# Patient Record
Sex: FEMALE | Race: WHITE | NOT HISPANIC OR LATINO | Employment: FULL TIME | URBAN - METROPOLITAN AREA
[De-identification: names, ages, dates, MRNs, and addresses within clinical notes are randomized per-mention and may not be internally consistent; named-entity substitution may affect disease eponyms.]

---

## 2018-08-14 ENCOUNTER — APPOINTMENT (OUTPATIENT)
Dept: RADIOLOGY | Age: 33
End: 2018-08-14
Payer: COMMERCIAL

## 2018-08-14 ENCOUNTER — OFFICE VISIT (OUTPATIENT)
Dept: URGENT CARE | Age: 33
End: 2018-08-14
Payer: COMMERCIAL

## 2018-08-14 VITALS
BODY MASS INDEX: 25.32 KG/M2 | RESPIRATION RATE: 20 BRPM | OXYGEN SATURATION: 99 % | WEIGHT: 137.6 LBS | HEIGHT: 62 IN | TEMPERATURE: 96.5 F

## 2018-08-14 DIAGNOSIS — M54.2 NECK PAIN: ICD-10-CM

## 2018-08-14 DIAGNOSIS — M54.2 NECK PAIN: Primary | ICD-10-CM

## 2018-08-14 PROCEDURE — 99203 OFFICE O/P NEW LOW 30 MIN: CPT | Performed by: PHYSICIAN ASSISTANT

## 2018-08-14 PROCEDURE — 72050 X-RAY EXAM NECK SPINE 4/5VWS: CPT

## 2018-08-14 RX ORDER — DESOGESTREL AND ETHINYL ESTRADIOL 21-5 (28)
1 KIT ORAL
COMMUNITY
Start: 2018-04-23

## 2018-08-14 RX ORDER — PAROXETINE HYDROCHLORIDE 40 MG/1
40 TABLET, FILM COATED ORAL
COMMUNITY
Start: 2018-06-26

## 2018-08-14 RX ORDER — METHOCARBAMOL 750 MG/1
750 TABLET, FILM COATED ORAL EVERY 6 HOURS PRN
Qty: 15 TABLET | Refills: 0 | Status: SHIPPED | OUTPATIENT
Start: 2018-08-14 | End: 2019-04-18 | Stop reason: ALTCHOICE

## 2018-08-14 RX ORDER — BUPROPION HYDROCHLORIDE 150 MG/1
150 TABLET ORAL
COMMUNITY
Start: 2018-02-12 | End: 2019-04-18 | Stop reason: ALTCHOICE

## 2018-08-14 RX ORDER — FLUTICASONE PROPIONATE 50 MCG
1 SPRAY, SUSPENSION (ML) NASAL
COMMUNITY
Start: 2015-04-20 | End: 2020-02-27 | Stop reason: SDUPTHER

## 2018-08-14 RX ORDER — ALPRAZOLAM 0.5 MG/1
0.5 TABLET ORAL 2 TIMES DAILY
COMMUNITY
Start: 2018-06-26

## 2018-08-14 NOTE — PATIENT INSTRUCTIONS
Motrin and/or Tylenol as needed for pain  Take medications as directed  Follow up with PCP in 3-5 days  Proceed to  ER if symptoms worsen  Acute Neck Pain   AMBULATORY CARE:   Acute neck pain  starts suddenly, increases quickly, and goes away in a few days  The pain may come and go, or be worse with certain movements  The pain may be only in your neck, or it may move to your arms, back, or shoulders  You may also have pain that starts in another body area and moves to your neck  Seek care immediately if:   · You have an injury that causes neck pain and shooting pain down your arms or legs  · Your neck pain suddenly becomes severe  · You have neck pain along with numbness, tingling, or weakness in your arms or legs  · You have a stiff neck, a headache, and a fever  Contact your healthcare provider if:   · You have new or worsening symptoms  · Your symptoms continue even after treatment  · You have questions or concerns about your condition or care  Treatment  may include any of the following, depending on what is causing your pain:  · Medicines  may be prescribed or recommended by your healthcare provider for pain  You may need medicine to treat nerve pain or to stop muscle spasms  Medicines may also be given to reduce inflammation  Your healthcare provider may inject medicine into a nerve to block pain  Over-the-counter NSAID medicine or acetaminophen may be recommended to help treat minor pain or inflammation  · Traction  is used to relieve pressure from nerves  Your head is gently pulled up and away from your neck  This stretches muscles and ligaments and makes more room for the spine  Your healthcare provider will tell you the kind of traction that will help your neck pain  Do not use traction devices at home unless directed by your healthcare provider  Manage or prevent acute neck pain:   · Rest your neck as directed    Do not make sudden movements, such as turning your head quickly  Your healthcare provider may recommend you wear a cervical collar for a short time  The collar will prevent you from moving your head  This will give your neck time to heal if an injury is causing your neck pain  Ask your healthcare provider when you can return to sports or other normal daily activities  · Apply heat as directed  Heat helps relieve pain and swelling  Use a heat wrap, or soak a small towel in warm water  Wring out the extra water  Apply the heat wrap or towel for 20 minutes every hour, or as directed  · Apply ice as directed  Ice helps relieve pain and swelling, and can help prevent tissue damage  Use an ice pack, or put ice in a bag  Cover the ice pack or back with a towel before you apply it to your neck  Apply the ice pack or ice for 15 minutes every hour, or as directed  Your healthcare provider can tell you how often to apply ice  · Do neck exercises as directed  Neck exercises help strengthen the muscles and increase range of motion  Your healthcare provider will tell you which exercises are right for you  He may give you instructions, or he may recommend that you work with a physical therapist  Your healthcare provider or therapist can make sure you are doing the exercises correctly  · Maintain good posture  Try to keep your head and shoulders lifted when you sit  If you work in front of a computer, make sure the monitor is at the right level  You should not need to look up down to see the screen  You should also not have to lean forward to be able to read what is on the screen  Make sure your keyboard, mouse, and other computer items are placed where you do not have to extend your shoulder to reach them  Get up often if you work in front of a computer or sit for long periods of time  Stretch or walk around to keep your neck muscles loose  Follow up with your healthcare provider as directed:   Your healthcare provider may refer you to a specialist if your pain does not get better with treatment  Write down your questions so you remember to ask them during your visits  © 2017 [employer] Information is for End User's use only and may not be sold, redistributed or otherwise used for commercial purposes  All illustrations and images included in CareNotes® are the copyrighted property of [employer]  or Jonathan Li  The above information is an  only  It is not intended as medical advice for individual conditions or treatments  Talk to your doctor, nurse or pharmacist before following any medical regimen to see if it is safe and effective for you
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
## 2019-04-18 ENCOUNTER — OFFICE VISIT (OUTPATIENT)
Dept: URGENT CARE | Age: 34
End: 2019-04-18
Payer: COMMERCIAL

## 2019-04-18 VITALS
HEART RATE: 67 BPM | RESPIRATION RATE: 20 BRPM | OXYGEN SATURATION: 99 % | TEMPERATURE: 98.6 F | WEIGHT: 141 LBS | BODY MASS INDEX: 25.95 KG/M2 | DIASTOLIC BLOOD PRESSURE: 72 MMHG | HEIGHT: 62 IN | SYSTOLIC BLOOD PRESSURE: 162 MMHG

## 2019-04-18 DIAGNOSIS — S39.012A STRAIN OF MUSCLE, FASCIA AND TENDON OF LOWER BACK, INITIAL ENCOUNTER: Primary | ICD-10-CM

## 2019-04-18 PROCEDURE — 99213 OFFICE O/P EST LOW 20 MIN: CPT | Performed by: PHYSICIAN ASSISTANT

## 2019-09-24 ENCOUNTER — OFFICE VISIT (OUTPATIENT)
Dept: URGENT CARE | Facility: CLINIC | Age: 34
End: 2019-09-24

## 2019-09-24 DIAGNOSIS — Z23 NEED FOR INFLUENZA VACCINATION: Primary | ICD-10-CM

## 2019-09-24 NOTE — PATIENT INSTRUCTIONS
Please call with any questions or concerns  Influenza Vaccine   AMBULATORY CARE:   The influenza vaccine  is an injection given to help prevent influenza (flu)  The flu is caused by a virus  The virus spreads from person to person through coughing and sneezing  Several types of viruses cause the flu  The viruses change over time, so new vaccines are made each year  The vaccine begins to protect you about 2 weeks after you get it  The flu shot usually injected into your upper arm  It may be given in your thigh  You may get a vaccine with a weak or dead virus  Call 911 for any of the following:   · Your mouth and throat are swollen  · You are wheezing or have trouble breathing  · You have chest pain or your heart is beating faster than normal for you  · You feel like you are going to faint  Seek care immediately if:   · Your face is red or swollen  · You have hives that spread over your body  · You feel weak or dizzy  Contact your healthcare provider if:   · You have increased pain, redness, or swelling around the area where the shot was given  · You have questions or concerns about the influenza vaccine  When to get the influenza vaccine: The influenza vaccine is offered every year starting in September or October  Get the influenza vaccine as soon as it is available  Children 6 months to 6years old need 2 doses during the first year they get the vaccine  The 2 doses should be given at least 4 weeks apart  It is best if the same type of vaccine is given both times  The child can then receive 1 dose each year  Children 9 years or older should get 1 dose each year          Who should get the flu shot:   · Infants 6 months or older    · Any healthy adult who would like to decrease the risk for the flu    · Anyone living with or caring for children younger than 5 years     · Healthcare workers    · Anyone who lives in a long-term care facility    · Anyone who has chronic health problems, such as asthma, diabetes, or blood disorders    · Anyone who has a weak immune system    · Women who are or will be pregnant during the flu season  Who should not get the flu shot:  If you have an egg allergy, ask your healthcare provider if it is safe to get the flu shot  You will need to be closely monitored by a healthcare provider while you receive the vaccine, and for an hour or more after  The following should not get the flu shot:  · Infants younger than 6 months     · Anyone who has had an allergic reaction to the flu shot    · Anyone who is sick or has a fever    · Anyone who received a diagnosis of Guillain-Barré syndrome within 6 weeks of getting a flu vaccine    · Anyone who is allergic to thimerosal (mercury)  Risks of the influenza vaccine: The flu shot may cause mild symptoms, such as a fever, headache, and muscle aches  It may also cause mild to moderate soreness or redness at the area where you were given the shot  The nasal spray may cause a fever, runny or stuffy nose, headache, muscle aches, or vomiting  You may still get the flu after you receive the influenza vaccine  If you are allergic to eggs, ask about an egg-free vaccine  You may have an allergic reaction to the vaccine  This can be life-threatening  Follow up with your healthcare provider as directed:  Write down your questions so you remember to ask them during your visits  © 2017 [employer] Information is for End User's use only and may not be sold, redistributed or otherwise used for commercial purposes  All illustrations and images included in CareNotes® are the copyrighted property of [employer]  or Jonathan Li  The above information is an  only  It is not intended as medical advice for individual conditions or treatments  Talk to your doctor, nurse or pharmacist before following any medical regimen to see if it is safe and effective for you

## 2019-09-24 NOTE — PROGRESS NOTES
[facility]        NAME: Kassandra Bhatt is a 28 y o  female  : 1985    MRN: [id number]  DATE: 2019  TIME: 12:22 PM    Assessment and Plan   Need for influenza vaccination [Z23]  1  Need for influenza vaccination  FLUARIX: influenza vaccine, quadrivalent, 0 5 mL         Patient Instructions       Please call with any questions or concerns  Proceed to  ER if symptoms worsen  Chief Complaint     Chief Complaint   Patient presents with    Immunizations     Flu shot          History of Present Illness       Pt is due for seasonal flu shot       Review of Systems   Review of Systems   Constitutional: Negative  Respiratory: Negative  Cardiovascular: Negative  All other systems reviewed and are negative  Current Medications       Current Outpatient Medications:     ALPRAZolam (XANAX) 0 5 mg tablet, Take 0 5 mg by mouth 2 (two) times a day, Disp: , Rfl:     desogestrel-ethinyl estradiol (KARIVA) 0 15-0 02/0 01 MG () per tablet, Take 1 tablet by mouth, Disp: , Rfl:     fluticasone (FLONASE) 50 mcg/act nasal spray, 1 spray, Disp: , Rfl:     PARoxetine (PAXIL) 40 MG tablet, Take 40 mg by mouth, Disp: , Rfl:     Current Allergies     Allergies as of 2019 - Reviewed 2019   Allergen Reaction Noted    Prednisone Other (See Comments) 2009            The following portions of the patient's history were reviewed and updated as appropriate: allergies, current medications, past family history, past medical history, past social history, past surgical history and problem list      Past Medical History:   Diagnosis Date    Anxiety     Depression        Past Surgical History:   Procedure Laterality Date    BREAST EXCISIONAL BIOPSY Right 2003    TONSILLECTOMY      VAGINAL DELIVERY         History reviewed  No pertinent family history  Medications have been verified  Objective   There were no vitals taken for this visit         Physical Exam Physical Exam   Constitutional: She is oriented to person, place, and time  She appears well-developed and well-nourished  HENT:   Head: Normocephalic and atraumatic  Neurological: She is alert and oriented to person, place, and time  Skin: Skin is warm and dry  Psychiatric: She has a normal mood and affect   Her behavior is normal

## 2020-01-27 ENCOUNTER — OFFICE VISIT (OUTPATIENT)
Dept: URGENT CARE | Facility: CLINIC | Age: 35
End: 2020-01-27

## 2020-01-27 VITALS
HEART RATE: 80 BPM | DIASTOLIC BLOOD PRESSURE: 80 MMHG | OXYGEN SATURATION: 98 % | RESPIRATION RATE: 16 BRPM | TEMPERATURE: 99.4 F | SYSTOLIC BLOOD PRESSURE: 118 MMHG

## 2020-01-27 DIAGNOSIS — J11.1 INFLUENZA: Primary | ICD-10-CM

## 2020-01-27 LAB
SL AMB POCT RAPID FLU A: POSITIVE
SL AMB POCT RAPID FLU B: NEGATIVE

## 2020-01-27 RX ORDER — OSELTAMIVIR PHOSPHATE 75 MG/1
75 CAPSULE ORAL EVERY 12 HOURS SCHEDULED
Qty: 10 CAPSULE | Refills: 0 | Status: SHIPPED | OUTPATIENT
Start: 2020-01-27 | End: 2020-02-01

## 2020-01-27 NOTE — ASSESSMENT & PLAN NOTE
Rapid flu test positive for flu A  Rx Tamiflu  Symptomatic care  Stay home from work until fever free for 24 hours  Follow up if symptoms worsen or persist

## 2020-01-27 NOTE — PROGRESS NOTES
[facility]        NAME: Boogie Peraza is a 27 y o  female  : 1985    MRN: [id number]  DATE: 2020  TIME: 10:53 AM    Assessment and Plan   Influenza [J11 1]  1  Influenza  oseltamivir (TAMIFLU) 75 mg capsule    POCT rapid flu A and B         Patient Instructions       Take Tamiflu as prescribed  Increase water intake  Take Tylenol as needed for fever or discomfort  Stay home from work until you are fever free for 24 hours without taking Tylenol or Motrin  Follow up with your PCP or return to the clinic if symptoms worsen or persist more than 3-5 days  Proceed to  ER if symptoms worsen  Chief Complaint     Chief Complaint   Patient presents with    Influenza         History of Present Illness       Body aches, fatigue, subjective fever, rhinitis, cough x 1 day  She awoke with symptoms today  Her son was diagnosed with the flu this weekend  She did not take any medication for this yet  She had her flu shot this season  Review of Systems   Review of Systems   Constitutional: Positive for fatigue and fever (subjective)  HENT: Positive for congestion, postnasal drip, rhinorrhea and sore throat  Respiratory: Positive for cough (dry)  Negative for shortness of breath and wheezing  Cardiovascular: Negative  All other systems reviewed and are negative          Current Medications       Current Outpatient Medications:     ALPRAZolam (XANAX) 0 5 mg tablet, Take 0 5 mg by mouth 2 (two) times a day, Disp: , Rfl:     desogestrel-ethinyl estradiol (KARIVA) 0 15-0 02/0 01 MG () per tablet, Take 1 tablet by mouth, Disp: , Rfl:     fluticasone (FLONASE) 50 mcg/act nasal spray, 1 spray, Disp: , Rfl:     PARoxetine (PAXIL) 40 MG tablet, Take 40 mg by mouth, Disp: , Rfl:     oseltamivir (TAMIFLU) 75 mg capsule, Take 1 capsule (75 mg total) by mouth every 12 (twelve) hours for 5 days, Disp: 10 capsule, Rfl: 0    Current Allergies     Allergies as of 2020 - Reviewed 01/27/2020   Allergen Reaction Noted    Prednisone Other (See Comments) 05/31/2009            The following portions of the patient's history were reviewed and updated as appropriate: allergies, current medications, past family history, past medical history, past social history, past surgical history and problem list      Past Medical History:   Diagnosis Date    Anxiety     Depression        Past Surgical History:   Procedure Laterality Date    BREAST EXCISIONAL BIOPSY Right 2003    TONSILLECTOMY      VAGINAL DELIVERY         History reviewed  No pertinent family history  Medications have been verified  Objective   /80 (BP Location: Right arm, Patient Position: Sitting, Cuff Size: Adult)   Pulse 80   Temp 99 4 °F (37 4 °C) (Tympanic)   Resp 16   SpO2 98%   Breastfeeding No        Physical Exam     Physical Exam   Constitutional: She is oriented to person, place, and time  She appears well-developed and well-nourished  HENT:   Head: Normocephalic and atraumatic  Right Ear: Hearing, tympanic membrane, external ear and ear canal normal    Left Ear: Hearing, tympanic membrane, external ear and ear canal normal    Nose: Mucosal edema and rhinorrhea present  Mouth/Throat: Uvula is midline and mucous membranes are normal  Posterior oropharyngeal erythema (cobblestoning appearance to throat) present  Tonsils are 0 on the right  Tonsils are 0 on the left  No tonsillar exudate  Eyes: Pupils are equal, round, and reactive to light  Conjunctivae are normal    Cardiovascular: Normal rate and regular rhythm  Pulmonary/Chest: Effort normal and breath sounds normal    Neurological: She is alert and oriented to person, place, and time  Skin: Skin is warm and dry  Capillary refill takes less than 2 seconds  Vitals reviewed

## 2020-01-27 NOTE — PATIENT INSTRUCTIONS
Take Tamiflu as prescribed  Increase water intake  Take Tylenol as needed for fever or discomfort  Stay home from work until you are fever free for 24 hours without taking Tylenol or Motrin  Follow up with your PCP or return to the clinic if symptoms worsen or persist more than 3-5 days  Influenza   AMBULATORY CARE:   Influenza  (the flu) is an infection caused by the influenza virus  The flu is easily spread when an infected person coughs, sneezes, or has close contact with others  You may be able to spread the flu to others for 1 week or longer after signs or symptoms appear  Common signs and symptoms include the following:   · Fever and chills    · Headaches, body aches, and muscle or joint pain    · Cough, runny nose, and sore throat    · Loss of appetite, nausea, vomiting, or diarrhea    · Tiredness    · Trouble breathing  Call 911 for any of the following:   · You have trouble breathing, and your lips look purple or blue  · You have a seizure  Seek care immediately if:   · You are dizzy, or you are urinating less or not at all  · You have a headache with a stiff neck, and you feel tired or confused  · You have new pain or pressure in your chest     · Your symptoms, such as shortness of breath, vomiting, or diarrhea, get worse  · Your symptoms, such as fever and coughing, seem to get better, but then get worse  Contact your healthcare provider if:   · You have new muscle pain or weakness  · You have questions or concerns about your condition or care  Treatment for influenza  may include any of the following:  · Acetaminophen  decreases pain and fever  It is available without a doctor's order  Ask how much to take and how often to take it  Follow directions  Acetaminophen can cause liver damage if not taken correctly  · NSAIDs , such as ibuprofen, help decrease swelling, pain, and fever  This medicine is available with or without a doctor's order   NSAIDs can cause stomach bleeding or kidney problems in certain people  If you take blood thinner medicine, always ask your healthcare provider if NSAIDs are safe for you  Always read the medicine label and follow directions  · Antivirals  help fight a viral infection  Manage your symptoms:   · Rest  as much as you can to help you recover  · Drink liquids as directed  to help prevent dehydration  Ask how much liquid to drink each day and which liquids are best for you  Prevent the spread of the flu:   · Wash your hands often  Use soap and water  Wash your hands after you use the bathroom, change a child's diapers, or sneeze  Wash your hands before you prepare or eat food  Use gel hand cleanser when soap and water are not available  Do not touch your eyes, nose, or mouth unless you have washed your hands first            · Cover your mouth when you sneeze or cough  Cough into a tissue or the bend of your arm  · Clean shared items with a germ-killing   Clean table surfaces, doorknobs, and light switches  Do not share towels, silverware, and dishes with people who are sick  Wash bed sheets, towels, silverware, and dishes with soap and water  · Wear a mask  over your mouth and nose if you are sick or are near anyone who is sick  · Stay away from others  if you are sick  · Influenza vaccine  helps prevent influenza (flu)  Everyone older than 6 months should get a yearly influenza vaccine  Get the vaccine as soon as it is available, usually in September or October each year  Follow up with your healthcare provider as directed:  Write down your questions so you remember to ask them during your visits  © 2017 [address] [employer] Information is for End User's use only and may not be sold, redistributed or otherwise used for commercial purposes  All illustrations and images included in CareNotes® are the copyrighted property of [employer]  or [employer] Li    The above information is an  only  It is not intended as medical advice for individual conditions or treatments  Talk to your doctor, nurse or pharmacist before following any medical regimen to see if it is safe and effective for you

## 2020-02-27 ENCOUNTER — OFFICE VISIT (OUTPATIENT)
Dept: URGENT CARE | Facility: CLINIC | Age: 35
End: 2020-02-27

## 2020-02-27 VITALS
RESPIRATION RATE: 16 BRPM | TEMPERATURE: 97.6 F | SYSTOLIC BLOOD PRESSURE: 122 MMHG | DIASTOLIC BLOOD PRESSURE: 80 MMHG | HEART RATE: 70 BPM | OXYGEN SATURATION: 99 %

## 2020-02-27 DIAGNOSIS — J01.00 ACUTE NON-RECURRENT MAXILLARY SINUSITIS: Primary | ICD-10-CM

## 2020-02-27 RX ORDER — FLUTICASONE PROPIONATE 50 MCG
2 SPRAY, SUSPENSION (ML) NASAL DAILY
Qty: 1 BOTTLE | Refills: 0 | Status: SHIPPED | OUTPATIENT
Start: 2020-02-27 | End: 2020-11-17 | Stop reason: SDUPTHER

## 2020-02-27 RX ORDER — AMOXICILLIN AND CLAVULANATE POTASSIUM 875; 125 MG/1; MG/1
1 TABLET, FILM COATED ORAL EVERY 12 HOURS SCHEDULED
Qty: 14 TABLET | Refills: 0 | Status: SHIPPED | OUTPATIENT
Start: 2020-02-27 | End: 2020-03-05

## 2020-02-27 RX ORDER — QUETIAPINE FUMARATE 25 MG/1
25 TABLET, FILM COATED ORAL
COMMUNITY
Start: 2019-11-04

## 2020-02-27 NOTE — ASSESSMENT & PLAN NOTE
Augmentin  Flonase nasal spray (pt tolerates flonase well with no allergies)  Increase water intake  Honey and warm salt water gargles PRN sore throat & cough  Nasal saline rinses  Follow up if symptoms worsen or persist

## 2020-02-27 NOTE — PROGRESS NOTES
[email or web address]        NAME: Tabatha Arauz is a 27 y o  female  : 1985    MRN: [id number]  DATE: 2020  TIME: 11:37 AM    Assessment and Plan   Acute non-recurrent maxillary sinusitis [J01 00]  1  Acute non-recurrent maxillary sinusitis  amoxicillin-clavulanate (AUGMENTIN) 875-125 mg per tablet    fluticasone (FLONASE) 50 mcg/act nasal spray         Patient Instructions       Take Augmentin as prescribed, take with food  Continue Flonase nasal spray daily  Increase your water intake and use nasal saline rinses  Recommend warm salt water gargles and honey as needed for sore throat and cough  Follow up with your PCP or return to the clinic if symptoms worsen or persist more than 3-5 days  Proceed to  ER if symptoms worsen  Chief Complaint     Chief Complaint   Patient presents with    Sinusitis         History of Present Illness       Sinus congestion, PND, sore throat x 1 week, Symptoms are worsening  Purulent yellow colored nasal discharge  She tried flonsase, symptoms are persisting  No fevers or chills  Review of Systems   Review of Systems   Constitutional: Negative  HENT: Positive for congestion (yellow colored nasal discharge), postnasal drip, rhinorrhea, sinus pressure, sinus pain and sore throat  Eyes: Negative  Respiratory: Positive for cough (dry)  Cardiovascular: Negative  Skin: Negative  Neurological: Positive for headaches  All other systems reviewed and are negative          Current Medications       Current Outpatient Medications:     ALPRAZolam (XANAX) 0 5 mg tablet, Take 0 5 mg by mouth 2 (two) times a day, Disp: , Rfl:     desogestrel-ethinyl estradiol (KARIVA) 0 15-0 02/0 01 MG () per tablet, Take 1 tablet by mouth, Disp: , Rfl:     fluticasone (FLONASE) 50 mcg/act nasal spray, 2 sprays into each nostril daily, Disp: 1 Bottle, Rfl: 0    PARoxetine (PAXIL) 40 MG tablet, Take 40 mg by mouth, Disp: , Rfl:     QUEtiapine (SEROquel) 25 mg tablet, Take 25 mg by mouth, Disp: , Rfl:     amoxicillin-clavulanate (AUGMENTIN) 875-125 mg per tablet, Take 1 tablet by mouth every 12 (twelve) hours for 7 days, Disp: 14 tablet, Rfl: 0    Current Allergies     Allergies as of 02/27/2020 - Reviewed 02/27/2020   Allergen Reaction Noted    Prednisone Other (See Comments) 05/31/2009            The following portions of the patient's history were reviewed and updated as appropriate: allergies, current medications, past family history, past medical history, past social history, past surgical history and problem list      Past Medical History:   Diagnosis Date    Anxiety     Depression        Past Surgical History:   Procedure Laterality Date    BREAST EXCISIONAL BIOPSY Right 2003    TONSILLECTOMY      VAGINAL DELIVERY         History reviewed  No pertinent family history  Medications have been verified  Objective   /80 (BP Location: Right arm, Patient Position: Sitting, Cuff Size: Adult)   Pulse 70   Temp 97 6 °F (36 4 °C) (Tympanic)   Resp 16   SpO2 99%        Physical Exam     Physical Exam   Constitutional: She is oriented to person, place, and time  She appears well-developed and well-nourished  HENT:   Head: Normocephalic and atraumatic  Right Ear: Hearing, external ear and ear canal normal  A middle ear effusion is present  Left Ear: Hearing, external ear and ear canal normal  A middle ear effusion is present  Nose: Mucosal edema and rhinorrhea present  Right sinus exhibits maxillary sinus tenderness  Left sinus exhibits maxillary sinus tenderness  Mouth/Throat: Uvula is midline and mucous membranes are normal  Posterior oropharyngeal erythema (cobblestoning appearance to throat) present  No oropharyngeal exudate  Tonsils are 0 on the right  Tonsils are 0 on the left  No tonsillar exudate  Eyes: Pupils are equal, round, and reactive to light  Conjunctivae are normal    Neck: Normal range of motion   Neck supple  Cardiovascular: Normal rate, regular rhythm and normal heart sounds  Pulmonary/Chest: Effort normal and breath sounds normal    Neurological: She is alert and oriented to person, place, and time  Skin: Skin is warm and dry  Psychiatric: She has a normal mood and affect  Her behavior is normal    Vitals reviewed

## 2020-02-27 NOTE — PATIENT INSTRUCTIONS
Take Augmentin as prescribed, take with food  Continue Flonase nasal spray daily  Increase your water intake and use nasal saline rinses  Recommend warm salt water gargles and honey as needed for sore throat and cough  Follow up with your PCP or return to the clinic if symptoms worsen or persist more than 3-5 days  Sinusitis, Ambulatory Care   GENERAL INFORMATION:   Sinusitis  is inflammation or infection of your sinuses  It is most often caused by a virus  Acute sinusitis may last up to 12 weeks  Chronic sinusitis lasts longer than 12 weeks  Recurrent sinusitis is when you have 3 or more episodes of sinusitis in 1 year  Common symptoms include the following:   · Fever    · Pain, pressure, redness, or swelling around the forehead, cheeks, or eyes    · Thick yellow or green discharge from your nose    · Tenderness when you touch your face over your sinuses    · Dry cough that happens mostly at night or when you lie down    · Headache and face pain that is worse when you lean forward    · Teeth pain or pain when you chew  Seek immediate care for the following symptoms:   · Vision changes such as double vision    · Confusion or trouble thinking clearly    · Headache and stiff neck    · Trouble breathing  Treatment for sinusitis  may include medicines to relieve nasal and sinus congestion or to decrease pain and fever  Ask your healthcare provider which medicines you should take and how much is safe  Manage sinusitis:   · Drink liquids as directed  Ask your healthcare provider how much liquid to drink each day and which liquids are best for you  Liquids will help loosen and drain the mucus in your sinuses  · Breathe in steam   Heat a bowl of water until you see steam  Lean over the bowl and make a tent over your head with a large towel  Breathe deeply for about 20 minutes  Be careful not to get too close to the steam or burn yourself  Do this 3 times a day   You can also breathe deeply when you take a hot shower  · Rinse your sinuses  Use a sinus rinse device to rinse your nasal passages with a saline (salt water) solution  This will help thin the mucus in your nose and rinse away pollen and dirt  It will also help reduce swelling so you can breathe normally  Ask how often to do this  · Use heat on your sinuses  to decrease pain  Apply heat for 15 to 20 minutes every hour for as many days as directed  · Sleep with your head elevated  Place an extra pillow under your head before you go to sleep to help your sinuses drain  · Do not smoke and avoid secondhand smoke  If you smoke, it is never too late to quit  Ask for information about how to stop smoking if you need help  Prevent the spread of germs that cause sinusitis:  Wash your hands often with soap and water  Wash your hands after you use the bathroom, change a child's diaper, or sneeze  Wash your hands before you prepare or eat food  Follow up with your healthcare provider as directed:  Write down your questions so you remember to ask them during your visits  CARE AGREEMENT:   You have the right to help plan your care  Learn about your health condition and how it may be treated  Discuss treatment options with your caregivers to decide what care you want to receive  You always have the right to refuse treatment  The above information is an  only  It is not intended as medical advice for individual conditions or treatments  Talk to your doctor, nurse or pharmacist before following any medical regimen to see if it is safe and effective for you  © 2014 [address] is for End User's use only and may not be sold, redistributed or otherwise used for commercial purposes  All illustrations and images included in CareNotes® are the copyrighted property of [employer]  or Jonathan Li

## 2020-07-15 ENCOUNTER — OFFICE VISIT (OUTPATIENT)
Dept: URGENT CARE | Facility: CLINIC | Age: 35
End: 2020-07-15

## 2020-07-15 VITALS
OXYGEN SATURATION: 97 % | DIASTOLIC BLOOD PRESSURE: 88 MMHG | TEMPERATURE: 99.1 F | HEART RATE: 60 BPM | RESPIRATION RATE: 16 BRPM | SYSTOLIC BLOOD PRESSURE: 138 MMHG

## 2020-07-15 DIAGNOSIS — J40 BRONCHITIS: ICD-10-CM

## 2020-07-15 DIAGNOSIS — J30.2 SEASONAL ALLERGIC RHINITIS, UNSPECIFIED TRIGGER: Primary | ICD-10-CM

## 2020-07-15 PROBLEM — J30.9 ALLERGIC RHINITIS: Status: ACTIVE | Noted: 2020-07-15

## 2020-07-15 RX ORDER — LORATADINE 10 MG/1
10 TABLET ORAL DAILY
COMMUNITY
End: 2021-11-09 | Stop reason: SDUPTHER

## 2020-07-15 RX ORDER — ALBUTEROL SULFATE 90 UG/1
2 AEROSOL, METERED RESPIRATORY (INHALATION) EVERY 6 HOURS PRN
Qty: 18 G | Refills: 0 | Status: SHIPPED | OUTPATIENT
Start: 2020-07-15

## 2020-07-15 RX ORDER — OLOPATADINE HYDROCHLORIDE 2 MG/ML
1 SOLUTION/ DROPS OPHTHALMIC DAILY
Qty: 2.5 ML | Refills: 0 | Status: SHIPPED | OUTPATIENT
Start: 2020-07-15 | End: 2020-07-22

## 2020-07-15 NOTE — ASSESSMENT & PLAN NOTE
Rhinitis, PND, itchy watery eyes, sore throat consistent with seasonal allergies  Continue flonase nasal spray and claritin daily  Use Pataday eye drops  Avoid allergens as much as possible

## 2020-07-15 NOTE — PATIENT INSTRUCTIONS
Get tested for COVID-19 today  Use Albuterol inhaler with spacer as needed for wheezing  Take Mucinex as needed, increase water intake  Continue flonase nasal spray and Claritin daily  Use Pataday eye drops daily  Avoid allergens as much as possible- keep windows closed and Air Conditioner on, do not dry clothes on clothesline outside, wash all linens, shower before bedtime  Follow up with your PCP or return to the clinic if symptoms worsen or persist more than 3-5 days  Allergic Rhinitis   AMBULATORY CARE:   Allergic rhinitis , or hay fever, is swelling of the inside of your nose  The swelling is a reaction to allergens in the air  An allergen can be anything that causes an allergic reaction  Allergies to weeds, grass, trees, or mold often cause seasonal allergic rhinitis  Indoor dust mites, cockroaches, pet dander, or mold can also cause allergic rhinitis  Common signs and symptoms include the following:   · Sneezing    · Nasal congestion    · Runny nose    · Itchy nose, eyes, or mouth    · Red, watery eyes    · Postnasal drip (nasal drainage down the back of your throat)    · Cough or frequent throat clearing    · Feeling tired or lethargic    · Dark circles under your eyes  Call 911 for the following:   · You have chest pain or shortness of breath  Seek care immediately if:   · You have severe pain  · You cough up blood  Contact your healthcare provider if:   · You have a fever  · You have ear or sinus pain, or a headache  · Your symptoms get worse, even after treatment  · You have yellow, green, brown, or bloody mucus coming from your nose  · Your nose is bleeding or you have pain inside your nose  · You have trouble sleeping because of your symptoms  · You have questions or concerns about your condition or care  Treatment:   · Antihistamines  help reduce itching, sneezing, and a runny nose  Some antihistamines can make you sleepy       · Nasal steroids  help decrease inflammation in your nose  · Decongestants  help clear your stuffy nose  · Immunotherapy  may be needed if your symptoms are severe or other treatments do not work  Immunotherapy is used to inject an allergen into your skin  At first, the therapy contains tiny amounts of the allergen  Your healthcare provider will slowly increase the amount of allergen  This may help your body be less sensitive to the allergen and stop reacting to it  You may need immunotherapy for weeks or longer  Manage allergic rhinitis:  The best way to manage allergic rhinitis is to avoid allergens that can trigger your symptoms  Any of the following may help decrease your symptoms:  · Rinse your nose and sinuses  with a salt water solution or use a salt water nasal spray  This will help thin the mucus in your nose and rinse away pollen and dirt  It will also help reduce swelling so you can breathe normally  Ask your healthcare provider how often to rinse your nose  · Reduce exposure to dust mites  Wash sheets and towels in hot water every week  Cover your pillows and mattresses with allergen-free covers  Limit the number of stuffed animals and soft toys your child has  Wash your child's toys in hot water regularly  Vacuum weekly and use a vacuum  with an air filter  If possible, get rid of carpets and curtains  These collect dust and dust mites  · Reduce exposure to pollen  Keep windows and doors closed in your house and car  Stay inside when air pollution or the pollen count is high  Run your air conditioner on recycle, and change air filters often  Shower and wash your hair before bed every night to rinse away pollen  · Reduce exposure to pet dander  If possible, do not keep cats, dogs, birds, or other pets  If you do keep pets in your home, keep them out of bedrooms and carpeted rooms  Bathe them often  · Reduce exposure to mold  Do not spend time in basements   Choose artificial plants instead of live plants  Keep your home's humidity at less than 45%  Do not have ponds or standing water in your home or yard  · Do not smoke  Avoid others who smoke  Ask your healthcare provider for information if you currently smoke and need help to quit  Follow up with your healthcare provider as directed:  Write down your questions so you remember to ask them during your visits  © 2017 [address]  Information is for End User's use only and may not be sold, redistributed or otherwise used for commercial purposes  All illustrations and images included in CareNotes® are the copyrighted property of [employer]  or Jonathan Li  The above information is an  only  It is not intended as medical advice for individual conditions or treatments  Talk to your doctor, nurse or pharmacist before following any medical regimen to see if it is safe and effective for you

## 2020-07-15 NOTE — ASSESSMENT & PLAN NOTE
Suspect viral bronchitis, symptomatic care recommended  Pt referred to COVID-19 testing site to rule out COVID infection today  Recommend pt quarantine until test results are available  Recommend Albuterol inhaler, mucinex, increase water intake  Follow up if symptoms worsen or persist

## 2020-07-15 NOTE — PROGRESS NOTES
[facility]        NAME: Jeneane Aschoff is a 27 y o  female  : 1985    MRN: [id number]  DATE: July 15, 2020  TIME: 12:06 PM    Assessment and Plan   Seasonal allergic rhinitis, unspecified trigger [J30 2]  1  Seasonal allergic rhinitis, unspecified trigger  olopatadine HCl (PATADAY) 0 2 % opth drops   2  Bronchitis  albuterol (Ventolin HFA) 90 mcg/act inhaler    Spacer Device for Inhaler         Patient Instructions       Get tested for COVID-19 today  Use Albuterol inhaler with spacer as needed for wheezing  Take Mucinex as needed, increase water intake  Continue flonase nasal spray and Claritin daily  Use Pataday eye drops daily  Avoid allergens as much as possible- keep windows closed and Air Conditioner on, do not dry clothes on clothesline outside, wash all linens, shower before bedtime  Follow up with your PCP or return to the clinic if symptoms worsen or persist more than 3-5 days  Proceed to  ER if symptoms worsen  Chief Complaint     Chief Complaint   Patient presents with    Cough    Allergic Rhinitis         History of Present Illness       Cough, rhinitis, fatigue, sore throat, mild wheezing, nausea, subjective fever, itchy watery eyes intermittently for 4 months  No recorded fever  She has not been tested for COVID-19  Cough is now more frequent for about 1 week  Denies sinus pressure  She takes flonase and claritin for seasonal allergies, has helped  Review of Systems   Review of Systems   Constitutional: Positive for fatigue (subjective) and fever  HENT: Positive for congestion, postnasal drip, rhinorrhea and sore throat  Negative for sinus pressure and sinus pain  Eyes: Positive for redness (b/l) and itching (b/l)  Negative for photophobia, pain, discharge and visual disturbance  Respiratory: Positive for cough (mostly dry) and wheezing (mild, occasional)  Negative for shortness of breath  Cardiovascular: Negative      Gastrointestinal: Positive for nausea  Negative for diarrhea and vomiting  Skin: Negative  Allergic/Immunologic: Positive for environmental allergies  All other systems reviewed and are negative  Current Medications       Current Outpatient Medications:     ALPRAZolam (XANAX) 0 5 mg tablet, Take 0 5 mg by mouth 2 (two) times a day, Disp: , Rfl:     desogestrel-ethinyl estradiol (KARIVA) 0 15-0 02/0 01 MG (21/5) per tablet, Take 1 tablet by mouth, Disp: , Rfl:     fluticasone (FLONASE) 50 mcg/act nasal spray, 2 sprays into each nostril daily, Disp: 1 Bottle, Rfl: 0    loratadine (CLARITIN) 10 mg tablet, Take 10 mg by mouth daily, Disp: , Rfl:     PARoxetine (PAXIL) 40 MG tablet, Take 40 mg by mouth, Disp: , Rfl:     QUEtiapine (SEROquel) 25 mg tablet, Take 25 mg by mouth, Disp: , Rfl:     albuterol (Ventolin HFA) 90 mcg/act inhaler, Inhale 2 puffs every 6 (six) hours as needed for wheezing, Disp: 18 g, Rfl: 0    olopatadine HCl (PATADAY) 0 2 % opth drops, Administer 1 drop to both eyes daily for 7 days, Disp: 2 5 mL, Rfl: 0    Current Allergies     Allergies as of 07/15/2020 - Reviewed 07/15/2020   Allergen Reaction Noted    Prednisone Other (See Comments) 05/31/2009            The following portions of the patient's history were reviewed and updated as appropriate: allergies, current medications, past family history, past medical history, past social history, past surgical history and problem list      Past Medical History:   Diagnosis Date    Allergic rhinitis     Anxiety     Depression        Past Surgical History:   Procedure Laterality Date    BREAST EXCISIONAL BIOPSY Right 2003    TONSILLECTOMY      VAGINAL DELIVERY         History reviewed  No pertinent family history  Medications have been verified          Objective   /88 (BP Location: Right arm, Patient Position: Sitting, Cuff Size: Adult)   Pulse 60   Temp 99 1 °F (37 3 °C) (Tympanic)   Resp 16   SpO2 97%        Physical Exam     Physical Exam Constitutional: She is oriented to person, place, and time  She appears well-developed and well-nourished  HENT:   Head: Normocephalic and atraumatic  Right Ear: Hearing, tympanic membrane, external ear and ear canal normal    Left Ear: Hearing, tympanic membrane, external ear and ear canal normal    Nose: Mucosal edema and rhinorrhea present  Mouth/Throat: Uvula is midline and mucous membranes are normal  Posterior oropharyngeal erythema (cobblestoning appearance to throat) present  No oropharyngeal exudate  Tonsils are 0 on the right  Tonsils are 0 on the left  No tonsillar exudate  Eyes: Pupils are equal, round, and reactive to light  Conjunctivae are normal    Neck: Normal range of motion  Neck supple  Cardiovascular: Normal rate, regular rhythm and normal heart sounds  Pulmonary/Chest: Effort normal and breath sounds normal    Neurological: She is alert and oriented to person, place, and time  Skin: Skin is warm, dry and intact  Psychiatric: She has a normal mood and affect  Her behavior is normal    Vitals reviewed

## 2020-10-13 ENCOUNTER — APPOINTMENT (OUTPATIENT)
Dept: URGENT CARE | Facility: CLINIC | Age: 35
End: 2020-10-13

## 2020-10-13 DIAGNOSIS — Z23 NEED FOR IMMUNIZATION AGAINST INFLUENZA: Primary | ICD-10-CM

## 2020-11-17 ENCOUNTER — OFFICE VISIT (OUTPATIENT)
Dept: URGENT CARE | Facility: CLINIC | Age: 35
End: 2020-11-17

## 2020-11-17 VITALS
SYSTOLIC BLOOD PRESSURE: 140 MMHG | OXYGEN SATURATION: 99 % | RESPIRATION RATE: 16 BRPM | TEMPERATURE: 98.5 F | HEART RATE: 66 BPM | DIASTOLIC BLOOD PRESSURE: 86 MMHG

## 2020-11-17 DIAGNOSIS — J30.2 SEASONAL ALLERGIC RHINITIS, UNSPECIFIED TRIGGER: Primary | ICD-10-CM

## 2020-11-17 RX ORDER — FLUTICASONE PROPIONATE 50 MCG
2 SPRAY, SUSPENSION (ML) NASAL DAILY
Qty: 1 BOTTLE | Refills: 0 | Status: SHIPPED | OUTPATIENT
Start: 2020-11-17 | End: 2021-11-09 | Stop reason: SDUPTHER

## 2021-03-23 ENCOUNTER — OFFICE VISIT (OUTPATIENT)
Dept: URGENT CARE | Facility: CLINIC | Age: 36
End: 2021-03-23

## 2021-03-23 VITALS
DIASTOLIC BLOOD PRESSURE: 82 MMHG | RESPIRATION RATE: 16 BRPM | SYSTOLIC BLOOD PRESSURE: 122 MMHG | OXYGEN SATURATION: 98 % | HEART RATE: 64 BPM | TEMPERATURE: 99.2 F

## 2021-03-23 DIAGNOSIS — R23.8 SKIN PIMPLE: Primary | ICD-10-CM

## 2021-03-23 NOTE — ASSESSMENT & PLAN NOTE
Located on the stefany of R ear   Recommend warm compress & antibiotic ointment  Pt educated not to "pop" or squeeze pimple  Reasons to follow up reviewed with pt

## 2021-03-23 NOTE — PATIENT INSTRUCTIONS
Apply a warm compress to the area several times daily  Do not squeeze or pop the pimple  Apply antibiotic ointment twice daily  Follow up with your PCP or return to the clinic if symptoms worsen or persist more than 3-5 days  Acne   AMBULATORY CARE:   Acne  is a skin condition that is common in adolescents  It usually gets better over time, and is usually gone by about age 22  Acne can continue into adulthood for some people  Different types of acne:  Acne most often appears on the face, neck, upper chest, back, and upper arms  · Whiteheads  are closed, white bumps that form when the pore is completely blocked  · Blackheads  are tiny, dark spots that form when the pore is blocked but stays open  · Pimples  are inflamed bumps that contain pus  They are often caused by clogged pores  Pimples develop when whiteheads or blackheads get infected  · Cystic acne  is made up of large inflamed nodules or cysts that contain pus  They look like large pimples and form deep inside the skin  They may cause pain and scars  Contact your healthcare provider if:   · You use retinoid medicine and you think you might be pregnant  · You use retinoid medicine and begin to have mood swings or personality changes  · You feel depressed  · You have a fever and inflammation of your skin  · Your acne does not get better, even after treatment  · You have questions or concerns about your condition or care  Treatment  depends on how severe your acne is  Your healthcare provider may recommend any of the following:  · Over-the-counter acne medicines  with benzoyl peroxide and salicylic acid may help to treat mild acne  They are available in the form of gels, lotions, creams, pads, or soaps  It may take several weeks for you to see an improvement  Follow the directions on the medicine label  Do not use more than directed  This medicine can cause dry and red skin if you use too much       · Prescription medicines  may be needed if over-the-counter medicines do not help after 2 months  You may need to take more than one kind of medicine to treat your acne  This may include antibiotics that kill bacteria and help decrease swelling  A type of prescription acne medicine called retinoids may cause serious birth defects  Do not  use this medicine if you are pregnant or may become pregnant  · Light therapy  may help decrease your acne  Ask your healthcare provider for more information about light therapy  Manage or prevent acne:   · Wash your face 2 times a day  with a gentle cleanser  This helps decrease oil buildup that leads to acne  Also wash your face if you have been sweating a lot, such as after exercise  · Use oil-free products  This includes sunscreen, moisturizers, and cosmetics  Hair products should also be oil-free  · Regularly wash your hair  to decrease oil  Oily hair that touches your face can increase acne  · Avoid touching your face  as much as possible  Do not pick, squeeze, or pop your pimples  This can make your acne worse because your hands contain oil  It can also cause scars to form on your face  · Avoid things that rub against your skin  as much as possible  This includes hats, helmets, and backpacks  Follow up with your healthcare provider as directed:  Write down your questions so you remember to ask them during your visits  © Copyright [address] Information is for End User's use only and may not be sold, redistributed or otherwise used for commercial purposes  All illustrations and images included in CareNotes® are the copyrighted property of [employer]  or [facility] Syed Gallegos   The above information is an  only  It is not intended as medical advice for individual conditions or treatments  Talk to your doctor, nurse or pharmacist before following any medical regimen to see if it is safe and effective for you

## 2021-03-23 NOTE — PROGRESS NOTES
[facility] [employer]        NAME: Esmer Mauricio is a 38 y o  female  : 1985    MRN: [id number]  DATE: 2021  TIME: 11:50 AM    Assessment and Plan   Skin pimple [R23 8]  1  Skin pimple           Patient Instructions       Apply a warm compress to the area several times daily  Do not squeeze or pop the pimple  Apply antibiotic ointment twice daily  Follow up with your PCP or return to the clinic if symptoms worsen or persist more than 3-5 days  Proceed to  ER if symptoms worsen  Chief Complaint     Chief Complaint   Patient presents with    Earache     Right          History of Present Illness       R ear pain x 2 days  Feels like a pimple in her ear canal  Occasional headache  Seasonal allergy symptoms have been worse recently with the weather change- rhinitis and nasal congestion  She takes an antihistamine as needed  No fever or chills  Review of Systems   Review of Systems   Constitutional: Negative for chills and fever  HENT: Positive for congestion (mild, secondary to seasonal allergies), ear pain (R auricle), rhinorrhea and sneezing  Negative for ear discharge and sore throat  Eyes: Negative for pain and visual disturbance  Respiratory: Negative for cough and shortness of breath  Cardiovascular: Negative for chest pain and palpitations  Gastrointestinal: Negative for abdominal pain and vomiting  Genitourinary: Negative for dysuria and hematuria  Musculoskeletal: Negative for arthralgias and back pain  Skin: Negative for color change and rash  Allergic/Immunologic: Positive for environmental allergies  Neurological: Negative for seizures and syncope  All other systems reviewed and are negative          Current Medications       Current Outpatient Medications:     albuterol (Ventolin HFA) 90 mcg/act inhaler, Inhale 2 puffs every 6 (six) hours as needed for wheezing, Disp: 18 g, Rfl: 0    ALPRAZolam (XANAX) 0 5 mg tablet, Take 0 5 mg by mouth 2 (two) times a day, Disp: , Rfl:     desogestrel-ethinyl estradiol (KARIVA) 0 15-0 02/0 01 MG (21/5) per tablet, Take 1 tablet by mouth, Disp: , Rfl:     loratadine (CLARITIN) 10 mg tablet, Take 10 mg by mouth daily, Disp: , Rfl:     PARoxetine (PAXIL) 40 MG tablet, Take 40 mg by mouth, Disp: , Rfl:     QUEtiapine (SEROquel) 25 mg tablet, Take 25 mg by mouth, Disp: , Rfl:     fluticasone (FLONASE) 50 mcg/act nasal spray, 2 sprays into each nostril daily, Disp: 1 Bottle, Rfl: 0    olopatadine HCl (PATADAY) 0 2 % opth drops, Administer 1 drop to both eyes daily for 7 days, Disp: 2 5 mL, Rfl: 0    Current Allergies     Allergies as of 03/23/2021 - Reviewed 03/23/2021   Allergen Reaction Noted    Prednisone Other (See Comments) 05/31/2009            The following portions of the patient's history were reviewed and updated as appropriate: allergies, current medications, past family history, past medical history, past social history, past surgical history and problem list      Past Medical History:   Diagnosis Date    Allergic rhinitis     Anxiety     Depression        Past Surgical History:   Procedure Laterality Date    BREAST EXCISIONAL BIOPSY Right 2003    TONSILLECTOMY      VAGINAL DELIVERY         History reviewed  No pertinent family history  Medications have been verified  Objective   /82 (BP Location: Right arm, Patient Position: Sitting, Cuff Size: Adult)   Pulse 64   Temp 99 2 °F (37 3 °C) (Tympanic)   Resp 16   SpO2 98%   No LMP recorded  (Menstrual status: Birth Control)  Physical Exam     Physical Exam  Vitals signs reviewed  Constitutional:       Appearance: She is well-developed  HENT:      Head: Normocephalic and atraumatic  Jaw: There is normal jaw occlusion  Right Ear: Hearing, tympanic membrane, ear canal and external ear normal  No decreased hearing noted  Swelling (mild, surrounding papule) and tenderness present  No drainage        Left Ear: Hearing, tympanic membrane, ear canal and external ear normal       Ears:        Nose: Mucosal edema and rhinorrhea present  Right Sinus: Maxillary sinus tenderness present  Left Sinus: Maxillary sinus tenderness present  Mouth/Throat:      Pharynx: Uvula midline  No oropharyngeal exudate  Eyes:      Conjunctiva/sclera: Conjunctivae normal       Pupils: Pupils are equal, round, and reactive to light  Neck:      Musculoskeletal: Normal range of motion and neck supple  Cardiovascular:      Rate and Rhythm: Normal rate and regular rhythm  Heart sounds: Normal heart sounds  Pulmonary:      Effort: Pulmonary effort is normal       Breath sounds: Normal breath sounds  Skin:     General: Skin is warm and dry  Neurological:      Mental Status: She is alert and oriented to person, place, and time     Psychiatric:         Behavior: Behavior normal

## 2021-11-09 ENCOUNTER — OFFICE VISIT (OUTPATIENT)
Dept: URGENT CARE | Facility: CLINIC | Age: 36
End: 2021-11-09

## 2021-11-09 VITALS
HEART RATE: 78 BPM | TEMPERATURE: 97.9 F | RESPIRATION RATE: 16 BRPM | DIASTOLIC BLOOD PRESSURE: 76 MMHG | SYSTOLIC BLOOD PRESSURE: 120 MMHG | OXYGEN SATURATION: 97 %

## 2021-11-09 DIAGNOSIS — J01.00 ACUTE NON-RECURRENT MAXILLARY SINUSITIS: Primary | ICD-10-CM

## 2021-11-09 RX ORDER — FLUTICASONE PROPIONATE 50 MCG
2 SPRAY, SUSPENSION (ML) NASAL DAILY
Qty: 9.9 ML | Refills: 0 | Status: SHIPPED | OUTPATIENT
Start: 2021-11-09 | End: 2022-01-03 | Stop reason: SDUPTHER

## 2021-11-09 RX ORDER — LORATADINE 10 MG/1
10 TABLET ORAL DAILY
Qty: 30 TABLET | Refills: 0 | Status: SHIPPED | OUTPATIENT
Start: 2021-11-09 | End: 2022-01-03 | Stop reason: SDUPTHER

## 2021-11-09 RX ORDER — AMOXICILLIN AND CLAVULANATE POTASSIUM 875; 125 MG/1; MG/1
1 TABLET, FILM COATED ORAL EVERY 12 HOURS SCHEDULED
Qty: 14 TABLET | Refills: 0 | Status: SHIPPED | OUTPATIENT
Start: 2021-11-11 | End: 2021-11-18

## 2022-01-03 ENCOUNTER — OFFICE VISIT (OUTPATIENT)
Dept: URGENT CARE | Facility: CLINIC | Age: 37
End: 2022-01-03

## 2022-01-03 VITALS
SYSTOLIC BLOOD PRESSURE: 110 MMHG | DIASTOLIC BLOOD PRESSURE: 70 MMHG | HEART RATE: 68 BPM | RESPIRATION RATE: 16 BRPM | OXYGEN SATURATION: 98 % | TEMPERATURE: 98.4 F

## 2022-01-03 DIAGNOSIS — J30.2 SEASONAL ALLERGIC RHINITIS, UNSPECIFIED TRIGGER: ICD-10-CM

## 2022-01-03 DIAGNOSIS — H66.001 NON-RECURRENT ACUTE SUPPURATIVE OTITIS MEDIA OF RIGHT EAR WITHOUT SPONTANEOUS RUPTURE OF TYMPANIC MEMBRANE: Primary | ICD-10-CM

## 2022-01-03 PROBLEM — H66.91 OTITIS MEDIA OF RIGHT EAR: Status: ACTIVE | Noted: 2022-01-03

## 2022-01-03 RX ORDER — IBUPROFEN 600 MG/1
600 TABLET ORAL ONCE
Status: COMPLETED | OUTPATIENT
Start: 2022-01-03 | End: 2022-01-03

## 2022-01-03 RX ORDER — AMOXICILLIN 875 MG/1
875 TABLET, COATED ORAL 2 TIMES DAILY
Qty: 20 TABLET | Refills: 0 | Status: SHIPPED | OUTPATIENT
Start: 2022-01-03 | End: 2022-01-13

## 2022-01-03 RX ORDER — FLUTICASONE PROPIONATE 50 MCG
2 SPRAY, SUSPENSION (ML) NASAL DAILY
Qty: 9.9 ML | Refills: 0 | Status: SHIPPED | OUTPATIENT
Start: 2022-01-03 | End: 2022-02-02

## 2022-01-03 RX ORDER — LORATADINE 10 MG/1
10 TABLET ORAL DAILY
Qty: 30 TABLET | Refills: 0 | Status: SHIPPED | OUTPATIENT
Start: 2022-01-03

## 2022-01-03 RX ADMIN — IBUPROFEN 600 MG: 600 TABLET ORAL at 12:58

## 2022-01-03 NOTE — ASSESSMENT & PLAN NOTE
History and exam findings consistent with mild R AOM, likely precipitated by environmental allergy exacerbation  Recommend "watch and wait" Rx for Amoxicillin  Symptomatic care also recommended  600 mg Ibuprofen given in the clinic  Reasons to follow up reviewed with pt

## 2022-01-03 NOTE — PATIENT INSTRUCTIONS
If symptoms persist or worsen in 2 days, start Amoxicillin  Take with food  Take Tylenol or Motrin as needed for pain  Increase water intake  Continue Flonase and Claritin daily  Follow up with your PCP or return to the clinic if symptoms worsen or persist more than 3-5 days  Ear Infection   AMBULATORY CARE:   An ear infection  is also called otitis media  Blocked or swollen eustachian tubes can cause an infection  Eustachian tubes connect the middle ear to the back of the nose and throat  They drain fluid from the middle ear  You may have a buildup of fluid in your ear  Germs build up in the fluid and infection develops  Common signs and symptoms:   · Ear pain    · Fever or a headache    · Trouble hearing    · Ringing or buzzing in your ear    · Plugged ear or an ear that feels full    · Dizziness    · Nausea or vomiting    Seek care immediately if:   · You have clear fluid coming from your ear  · You have a stiff neck, headache, and a fever  Call your doctor if:   · You see blood or pus draining from your ear  · Your ear pain gets worse or does not go away, even after treatment  · The outside of your ear is red or swollen  · You are vomiting or have diarrhea  · You have questions or concerns about your condition or care  Medicines: You may  need any of the following:  · Acetaminophen  decreases pain and fever  It is available without a doctor's order  Ask how much to take and how often to take it  Follow directions  Read the labels of all other medicines you are using to see if they also contain acetaminophen, or ask your doctor or pharmacist  Acetaminophen can cause liver damage if not taken correctly  Do not use more than 4 grams (4,000 milligrams) total of acetaminophen in one day  · NSAIDs , such as ibuprofen, help decrease swelling, pain, and fever  This medicine is available with or without a doctor's order   NSAIDs can cause stomach bleeding or kidney problems in certain people  If you take blood thinner medicine, always ask your healthcare provider if NSAIDs are safe for you  Always read the medicine label and follow directions  · Ear drops  may contain medicine to decrease pain and inflammation  · Antibiotics  help treat a bacterial infection  Self-care:   · Apply heat  on your ear for 15 to 20 minutes, 3 to 4 times a day or as directed  You can apply heat with an electric heating pad, hot water bottle, or warm compress  Always put a cloth between your skin and the heat pack to prevent burns  Heat helps decrease pain  · Apply ice  on your ear for 15 to 20 minutes, 3 to 4 times a day for 2 days or as directed  Use an ice pack, or put crushed ice in a plastic bag  Cover it with a towel before you apply it to your ear  Ice decreases swelling and pain  Prevent an ear infection:   · Wash your hands often  to help prevent the spread of germs  Ask everyone in your house to wash their hands with soap and water  Ask them to wash after they use the bathroom or change a diaper  Remind them to wash before they prepare or eat food  · Stay away from people who are ill  Some germs spread easily and quickly through contact  Follow up with your doctor as directed:  Write down your questions so you remember to ask them during your visits  © Copyright [employer] 2021 Information is for End User's use only and may not be sold, redistributed or otherwise used for commercial purposes  All illustrations and images included in CareNotes® are the copyrighted property of A D A M , Inc  or [facility] Syed Gallegos   The above information is an  only  It is not intended as medical advice for individual conditions or treatments  Talk to your doctor, nurse or pharmacist before following any medical regimen to see if it is safe and effective for you

## 2022-01-03 NOTE — PROGRESS NOTES
[facility]        NAME: Sly Alonso is a 39 y o  female  : 1985    MRN: [id number]  DATE: January 3, 2022  TIME: 1:48 PM    Assessment and Plan   Non-recurrent acute suppurative otitis media of right ear without spontaneous rupture of tympanic membrane [H66 001]  1  Non-recurrent acute suppurative otitis media of right ear without spontaneous rupture of tympanic membrane  amoxicillin (AMOXIL) 875 mg tablet    ibuprofen (MOTRIN) tablet 600 mg   2  Seasonal allergic rhinitis, unspecified trigger  fluticasone (FLONASE) 50 mcg/act nasal spray    loratadine (CLARITIN) 10 mg tablet         Patient Instructions       If symptoms persist or worsen in 2 days, start Amoxicillin  Take with food  Take Tylenol or Motrin as needed for pain  Increase water intake  Continue Flonase and Claritin daily  Follow up with your PCP or return to the clinic if symptoms worsen or persist more than 3-5 days  Proceed to  ER if symptoms worsen  Chief Complaint     Chief Complaint   Patient presents with    Earache     right         History of Present Illness       R earache & tender lymph nodes in R neck x 1 day  Discomfort when swallowing  No fever or chills  She did not take any medication yet  She feels her allergies have been worse the past 2-3 days  Denies rhinitis, sinus congestion, or cough  Review of Systems   Review of Systems   Constitutional: Negative  HENT: Positive for ear pain (right) and sore throat (when swallowing)  Negative for congestion, ear discharge, nosebleeds, postnasal drip, rhinorrhea, sinus pressure, sinus pain and trouble swallowing  Eyes: Positive for redness and itching  Negative for photophobia, pain, discharge and visual disturbance  Respiratory: Negative  Negative for cough, shortness of breath and wheezing  Cardiovascular: Negative  Negative for chest pain and palpitations  Musculoskeletal: Negative  Skin: Negative      All other systems reviewed and are negative  Current Medications       Current Outpatient Medications:     albuterol (Ventolin HFA) 90 mcg/act inhaler, Inhale 2 puffs every 6 (six) hours as needed for wheezing, Disp: 18 g, Rfl: 0    ALPRAZolam (XANAX) 0 5 mg tablet, Take 0 5 mg by mouth 2 (two) times a day, Disp: , Rfl:     desogestrel-ethinyl estradiol (KARIVA) 0 15-0 02/0 01 MG (21/5) per tablet, Take 1 tablet by mouth, Disp: , Rfl:     loratadine (CLARITIN) 10 mg tablet, Take 1 tablet (10 mg total) by mouth daily, Disp: 30 tablet, Rfl: 0    PARoxetine (PAXIL) 40 MG tablet, Take 40 mg by mouth, Disp: , Rfl:     QUEtiapine (SEROquel) 25 mg tablet, Take 25 mg by mouth, Disp: , Rfl:     amoxicillin (AMOXIL) 875 mg tablet, Take 1 tablet (875 mg total) by mouth 2 (two) times a day for 10 days, Disp: 20 tablet, Rfl: 0    fluticasone (FLONASE) 50 mcg/act nasal spray, 2 sprays into each nostril daily, Disp: 9 9 mL, Rfl: 0    olopatadine HCl (PATADAY) 0 2 % opth drops, Administer 1 drop to both eyes daily for 7 days, Disp: 2 5 mL, Rfl: 0  No current facility-administered medications for this visit  Current Allergies     Allergies as of 01/03/2022 - Reviewed 01/03/2022   Allergen Reaction Noted    Prednisone Other (See Comments) 05/31/2009            The following portions of the patient's history were reviewed and updated as appropriate: allergies, current medications, past family history, past medical history, past social history, past surgical history and problem list      Past Medical History:   Diagnosis Date    Allergic rhinitis     Anxiety     Depression        Past Surgical History:   Procedure Laterality Date    BREAST EXCISIONAL BIOPSY Right 2003    TONSILLECTOMY      VAGINAL DELIVERY         History reviewed  No pertinent family history  Medications have been verified          Objective   /70 (BP Location: Right arm, Patient Position: Sitting, Cuff Size: Large)   Pulse 68   Temp 98 4 °F (36 9 °C) (Tympanic)   Resp 16   SpO2 98%   No LMP recorded  (Menstrual status: Birth Control)  Physical Exam     Physical Exam  Vitals reviewed  Constitutional:       Appearance: Normal appearance  She is well-developed  HENT:      Head: Normocephalic and atraumatic  Jaw: There is normal jaw occlusion  Right Ear: Hearing, ear canal and external ear normal  Tenderness present  No drainage or swelling  No middle ear effusion  No mastoid tenderness  Tympanic membrane is bulging  Tympanic membrane is not injected, scarred or erythematous  Left Ear: Hearing, tympanic membrane, ear canal and external ear normal  No drainage, swelling or tenderness  No middle ear effusion  No mastoid tenderness  Tympanic membrane is not injected, scarred, erythematous or bulging  Nose: Nose normal       Mouth/Throat:      Lips: Pink  Mouth: Mucous membranes are moist       Dentition: Normal dentition  Pharynx: Uvula midline  Posterior oropharyngeal erythema (cobblestoning appearance) present  Tonsils: Tonsillar exudate present  1+ on the right  1+ on the left  Cardiovascular:      Rate and Rhythm: Normal rate and regular rhythm  Pulses: Normal pulses  Heart sounds: Normal heart sounds  Pulmonary:      Effort: Pulmonary effort is normal       Breath sounds: Normal breath sounds  Musculoskeletal:         General: Normal range of motion  Skin:     General: Skin is warm and dry  Capillary Refill: Capillary refill takes less than 2 seconds  Neurological:      General: No focal deficit present  Mental Status: She is alert and oriented to person, place, and time     Psychiatric:         Mood and Affect: Mood normal          Behavior: Behavior normal

## 2022-03-08 ENCOUNTER — OFFICE VISIT (OUTPATIENT)
Dept: URGENT CARE | Facility: CLINIC | Age: 37
End: 2022-03-08

## 2022-03-08 VITALS
DIASTOLIC BLOOD PRESSURE: 84 MMHG | HEART RATE: 64 BPM | RESPIRATION RATE: 16 BRPM | SYSTOLIC BLOOD PRESSURE: 130 MMHG | OXYGEN SATURATION: 99 % | TEMPERATURE: 98.1 F

## 2022-03-08 DIAGNOSIS — H66.002 NON-RECURRENT ACUTE SUPPURATIVE OTITIS MEDIA OF LEFT EAR WITHOUT SPONTANEOUS RUPTURE OF TYMPANIC MEMBRANE: Primary | ICD-10-CM

## 2022-03-08 RX ORDER — AMOXICILLIN 875 MG/1
875 TABLET, COATED ORAL 2 TIMES DAILY
Qty: 20 TABLET | Refills: 0 | Status: SHIPPED | OUTPATIENT
Start: 2022-03-08 | End: 2022-03-18

## 2022-03-08 NOTE — PROGRESS NOTES
[facility]        NAME: Megan Dunn is a 39 y o  female  : 1985    MRN: [id number]  DATE: 2022  TIME: 2:09 PM    Assessment and Plan   Non-recurrent acute suppurative otitis media of left ear without spontaneous rupture of tympanic membrane [H66 002]  1  Non-recurrent acute suppurative otitis media of left ear without spontaneous rupture of tympanic membrane  amoxicillin (AMOXIL) 875 mg tablet         Patient Instructions       Take Amoxicillin as prescribed  Take with food  Continue tylenol or Motrin as needed for discomfort  Follow up with your PCP or return to the clinic if symptoms worsen or persist more than 3-5 days  Proceed to  ER if symptoms worsen  Chief Complaint     Chief Complaint   Patient presents with    Earache     left          History of Present Illness       L ear pain x 2 days  She tried tylenol, motrin, and OTC migraine medication- mild improvement  Posterior left sided headache  No fevers or chills  No known sick contacts  No rhinitis or sinus/nasal congestion  Review of Systems   Review of Systems   Constitutional: Negative  Negative for chills and fever  HENT: Positive for ear pain (left)  Negative for congestion, ear discharge, postnasal drip, rhinorrhea, sinus pressure, sinus pain, sneezing and sore throat  Eyes: Negative  Negative for photophobia and visual disturbance  Respiratory: Negative  Cardiovascular: Negative  Musculoskeletal: Negative  Skin: Negative  Negative for rash  Allergic/Immunologic: Positive for environmental allergies  Neurological: Positive for headaches  All other systems reviewed and are negative          Current Medications       Current Outpatient Medications:     albuterol (Ventolin HFA) 90 mcg/act inhaler, Inhale 2 puffs every 6 (six) hours as needed for wheezing, Disp: 18 g, Rfl: 0    ALPRAZolam (XANAX) 0 5 mg tablet, Take 0 5 mg by mouth 2 (two) times a day, Disp: , Rfl:    desogestrel-ethinyl estradiol (KARIVA) 0 15-0 02/0 01 MG (21/5) per tablet, Take 1 tablet by mouth, Disp: , Rfl:     loratadine (CLARITIN) 10 mg tablet, Take 1 tablet (10 mg total) by mouth daily, Disp: 30 tablet, Rfl: 0    PARoxetine (PAXIL) 40 MG tablet, Take 40 mg by mouth, Disp: , Rfl:     QUEtiapine (SEROquel) 25 mg tablet, Take 25 mg by mouth, Disp: , Rfl:     amoxicillin (AMOXIL) 875 mg tablet, Take 1 tablet (875 mg total) by mouth 2 (two) times a day for 10 days, Disp: 20 tablet, Rfl: 0    fluticasone (FLONASE) 50 mcg/act nasal spray, 2 sprays into each nostril daily, Disp: 9 9 mL, Rfl: 0    olopatadine HCl (PATADAY) 0 2 % opth drops, Administer 1 drop to both eyes daily for 7 days, Disp: 2 5 mL, Rfl: 0    Current Allergies     Allergies as of 03/08/2022 - Reviewed 03/08/2022   Allergen Reaction Noted    Prednisone Other (See Comments) 05/31/2009            The following portions of the patient's history were reviewed and updated as appropriate: allergies, current medications, past family history, past medical history, past social history, past surgical history and problem list      Past Medical History:   Diagnosis Date    Allergic rhinitis     Anxiety     Depression        Past Surgical History:   Procedure Laterality Date    BREAST EXCISIONAL BIOPSY Right 2003    TONSILLECTOMY      VAGINAL DELIVERY         History reviewed  No pertinent family history  Medications have been verified  Objective   /84 (BP Location: Right arm, Patient Position: Sitting, Cuff Size: Adult)   Pulse 64   Temp 98 1 °F (36 7 °C) (Tympanic)   Resp 16   SpO2 99%   No LMP recorded  (Menstrual status: Birth Control)  Physical Exam     Physical Exam  Vitals reviewed  Constitutional:       Appearance: Normal appearance  She is well-developed  HENT:      Head: Normocephalic and atraumatic  Jaw: There is normal jaw occlusion        Right Ear: Hearing, ear canal and external ear normal  No drainage, swelling or tenderness  A middle ear effusion is present  There is no impacted cerumen  Tympanic membrane is not erythematous or bulging  Left Ear: Hearing, ear canal and external ear normal  Tenderness present  No drainage or swelling  No middle ear effusion  There is no impacted cerumen  Tympanic membrane is bulging  Tympanic membrane is not erythematous  Nose: Nose normal  No congestion or rhinorrhea  Right Turbinates: Not swollen  Left Turbinates: Not swollen  Right Sinus: No maxillary sinus tenderness or frontal sinus tenderness  Left Sinus: No maxillary sinus tenderness or frontal sinus tenderness  Mouth/Throat:      Lips: Pink  Mouth: Mucous membranes are moist       Dentition: Normal dentition  Pharynx: Oropharynx is clear  No posterior oropharyngeal erythema  Tonsils: No tonsillar exudate  0 on the right  0 on the left  Eyes:      Conjunctiva/sclera: Conjunctivae normal       Pupils: Pupils are equal, round, and reactive to light  Cardiovascular:      Rate and Rhythm: Normal rate and regular rhythm  Pulses: Normal pulses  Heart sounds: Normal heart sounds  Pulmonary:      Effort: Pulmonary effort is normal       Breath sounds: Normal breath sounds  Musculoskeletal:         General: Normal range of motion  Skin:     General: Skin is warm and dry  Capillary Refill: Capillary refill takes less than 2 seconds  Neurological:      General: No focal deficit present  Mental Status: She is alert and oriented to person, place, and time  Cranial Nerves: Cranial nerves are intact  Sensory: Sensation is intact  Motor: Motor function is intact  Coordination: Coordination is intact  Gait: Gait is intact     Psychiatric:         Mood and Affect: Mood normal          Behavior: Behavior normal

## 2022-03-08 NOTE — PATIENT INSTRUCTIONS
Take Amoxicillin as prescribed  Take with food  Continue tylenol or Motrin as needed for discomfort  Follow up with your PCP or return to the clinic if symptoms worsen or persist more than 3-5 days  Ear Infection   AMBULATORY CARE:   An ear infection  is also called otitis media  Blocked or swollen eustachian tubes can cause an infection  Eustachian tubes connect the middle ear to the back of the nose and throat  They drain fluid from the middle ear  You may have a buildup of fluid in your ear  Germs build up in the fluid and infection develops  Common signs and symptoms:   · Ear pain    · Fever or a headache    · Trouble hearing    · Ringing or buzzing in your ear    · Plugged ear or an ear that feels full    · Dizziness    · Nausea or vomiting    Seek care immediately if:   · You have clear fluid coming from your ear  · You have a stiff neck, headache, and a fever  Call your doctor if:   · You see blood or pus draining from your ear  · Your ear pain gets worse or does not go away, even after treatment  · The outside of your ear is red or swollen  · You are vomiting or have diarrhea  · You have questions or concerns about your condition or care  Medicines: You may  need any of the following:  · Acetaminophen  decreases pain and fever  It is available without a doctor's order  Ask how much to take and how often to take it  Follow directions  Read the labels of all other medicines you are using to see if they also contain acetaminophen, or ask your doctor or pharmacist  Acetaminophen can cause liver damage if not taken correctly  Do not use more than 4 grams (4,000 milligrams) total of acetaminophen in one day  · NSAIDs , such as ibuprofen, help decrease swelling, pain, and fever  This medicine is available with or without a doctor's order  NSAIDs can cause stomach bleeding or kidney problems in certain people   If you take blood thinner medicine, always ask your healthcare provider if NSAIDs are safe for you  Always read the medicine label and follow directions  · Ear drops  may contain medicine to decrease pain and inflammation  · Antibiotics  help treat a bacterial infection  Self-care:   · Apply heat  on your ear for 15 to 20 minutes, 3 to 4 times a day or as directed  You can apply heat with an electric heating pad, hot water bottle, or warm compress  Always put a cloth between your skin and the heat pack to prevent burns  Heat helps decrease pain  · Apply ice  on your ear for 15 to 20 minutes, 3 to 4 times a day for 2 days or as directed  Use an ice pack, or put crushed ice in a plastic bag  Cover it with a towel before you apply it to your ear  Ice decreases swelling and pain  Prevent an ear infection:   · Wash your hands often  to help prevent the spread of germs  Ask everyone in your house to wash their hands with soap and water  Ask them to wash after they use the bathroom or change a diaper  Remind them to wash before they prepare or eat food  · Stay away from people who are ill  Some germs spread easily and quickly through contact  Follow up with your doctor as directed:  Write down your questions so you remember to ask them during your visits  © Copyright [employer] 2022 Information is for End User's use only and may not be sold, redistributed or otherwise used for commercial purposes  All illustrations and images included in CareNotes® are the copyrighted property of [employer]  or Michael Sanchez  The above information is an  only  It is not intended as medical advice for individual conditions or treatments  Talk to your doctor, nurse or pharmacist before following any medical regimen to see if it is safe and effective for you

## 2022-03-08 NOTE — ASSESSMENT & PLAN NOTE
History and exam findings consistent with left AOM  Rx written for Amoxicillin  Symptomatic care also recommended  Reasons to follow up reviewed with pt

## 2022-04-21 ENCOUNTER — APPOINTMENT (OUTPATIENT)
Dept: URGENT CARE | Facility: CLINIC | Age: 37
End: 2022-04-21

## 2022-04-21 DIAGNOSIS — N64.4 BREAST TENDERNESS IN FEMALE: Primary | ICD-10-CM

## 2022-04-21 LAB — SL AMB POCT URINE HCG: NEGATIVE

## 2022-04-21 NOTE — PROGRESS NOTES
Pt is requesting pregnancy test   She stopped taking her birth control pills about 2 months ago  She reports PMS symptoms (breast tenderness, lower back pain) x 1 week but has not gotten her menses  She cannot recall when she is due for her menses so is unsure if it is late  Last month her period was heavier and longer than normal   No nausea or vomiting  Urine hcg was negative today  Recommend pt repeat test in 48 hours  She agrees to repeat test in a few days if she does not start her menses  Common symptoms to expect when starting or stopping hormonal birth control discussed  She will resume her birth control pills after her next menses starts  All questions answered

## 2022-10-11 PROBLEM — H66.002 NON-RECURRENT ACUTE SUPPURATIVE OTITIS MEDIA OF LEFT EAR WITHOUT SPONTANEOUS RUPTURE OF TYMPANIC MEMBRANE: Status: RESOLVED | Noted: 2022-03-08 | Resolved: 2022-10-11

## 2023-01-23 ENCOUNTER — OFFICE VISIT (OUTPATIENT)
Dept: URGENT CARE | Facility: CLINIC | Age: 38
End: 2023-01-23

## 2023-01-23 VITALS
SYSTOLIC BLOOD PRESSURE: 118 MMHG | RESPIRATION RATE: 16 BRPM | HEART RATE: 60 BPM | DIASTOLIC BLOOD PRESSURE: 80 MMHG | OXYGEN SATURATION: 99 % | TEMPERATURE: 99 F

## 2023-01-23 DIAGNOSIS — J40 BRONCHITIS: Primary | ICD-10-CM

## 2023-01-23 DIAGNOSIS — H04.123 CHRONIC DRYNESS OF BOTH EYES: ICD-10-CM

## 2023-01-23 RX ORDER — ALBUTEROL SULFATE 2.5 MG/3ML
2.5 SOLUTION RESPIRATORY (INHALATION) ONCE
Status: COMPLETED | OUTPATIENT
Start: 2023-01-23 | End: 2023-01-23

## 2023-01-23 RX ORDER — PROPYLENE GLYCOL 0.06 MG/ML
1 SOLUTION/ DROPS OPHTHALMIC 2 TIMES DAILY
Qty: 15 ML | Refills: 0 | Status: SHIPPED | OUTPATIENT
Start: 2023-01-23

## 2023-01-23 RX ORDER — ALBUTEROL SULFATE 90 UG/1
2 AEROSOL, METERED RESPIRATORY (INHALATION) EVERY 6 HOURS PRN
Qty: 18 G | Refills: 0 | Status: SHIPPED | OUTPATIENT
Start: 2023-01-23

## 2023-01-23 RX ORDER — PROPYLENE GLYCOL 0.06 MG/ML
1 SOLUTION/ DROPS OPHTHALMIC 2 TIMES DAILY PRN
COMMUNITY
End: 2023-01-23 | Stop reason: SDUPTHER

## 2023-01-23 RX ADMIN — ALBUTEROL SULFATE 2.5 MG: 2.5 SOLUTION RESPIRATORY (INHALATION) at 11:31

## 2023-01-23 NOTE — ASSESSMENT & PLAN NOTE
History and exam findings consistent with bronchitis  Rx written for Ventolin inhaler  Pt states she already has a spacer at home  Symptomatic care also recommended  Reasons to follow up reviewed with pt  All questions answered

## 2023-01-23 NOTE — PROGRESS NOTES
[facility]        NAME: Joanna Delcid is a 44 y o  female  : 1985    MRN: [id number]  DATE: 2023  TIME: 11:38 AM    Assessment and Plan   Bronchitis [J40]  1  Bronchitis  albuterol (Ventolin HFA) 90 mcg/act inhaler    albuterol inhalation solution 2 5 mg      2  Chronic dryness of both eyes  Propylene Glycol (Systane Complete) 0 6 % SOLN            Patient Instructions       Follow up with PCP in 3-5 days  Proceed to  ER if symptoms worsen  Chief Complaint     Chief Complaint   Patient presents with   • Cough   • Wheezing                History of Present Illness       Pt c/o ongoing cough and wheezing x 2 weeks  She had sinus congestion previously, now resolved  She took an at home COVID test, was negative  Chest congestion and occasional wheezing persists  No fever or chills  Review of Systems   Review of Systems   Constitutional: Negative  Negative for chills, fatigue and fever  HENT: Positive for postnasal drip and rhinorrhea  Negative for congestion, ear discharge, ear pain, hearing loss, sinus pressure, sinus pain, sore throat and trouble swallowing  Eyes: Negative  Respiratory: Positive for cough and wheezing  Cardiovascular: Negative  Negative for chest pain and palpitations  Musculoskeletal: Negative  Skin: Negative  Negative for rash  All other systems reviewed and are negative          Current Medications       Current Outpatient Medications:   •  albuterol (Ventolin HFA) 90 mcg/act inhaler, Inhale 2 puffs every 6 (six) hours as needed for wheezing, Disp: 18 g, Rfl: 0  •  ALPRAZolam (XANAX) 0 5 mg tablet, Take 0 5 mg by mouth 2 (two) times a day, Disp: , Rfl:   •  loratadine (CLARITIN) 10 mg tablet, Take 1 tablet (10 mg total) by mouth daily, Disp: 30 tablet, Rfl: 0  •  PARoxetine (PAXIL) 40 MG tablet, Take 40 mg by mouth, Disp: , Rfl:   •  Propylene Glycol (Systane Complete) 0 6 % SOLN, Apply 1 drop to eye 2 (two) times a day, Disp: 15 mL, Rfl: 0  •  QUEtiapine (SEROquel) 25 mg tablet, Take 25 mg by mouth, Disp: , Rfl:   •  desogestrel-ethinyl estradiol (KARIVA) 0 15-0 02/0 01 MG (21/5) per tablet, Take 1 tablet by mouth (Patient not taking: Reported on 1/23/2023), Disp: , Rfl:   •  fluticasone (FLONASE) 50 mcg/act nasal spray, 2 sprays into each nostril daily, Disp: 9 9 mL, Rfl: 0  •  olopatadine HCl (PATADAY) 0 2 % opth drops, Administer 1 drop to both eyes daily for 7 days, Disp: 2 5 mL, Rfl: 0  No current facility-administered medications for this visit  Current Allergies     Allergies as of 01/23/2023 - Reviewed 01/23/2023   Allergen Reaction Noted   • Prednisone Other (See Comments) 05/31/2009            The following portions of the patient's history were reviewed and updated as appropriate: allergies, current medications, past family history, past medical history, past social history, past surgical history and problem list      Past Medical History:   Diagnosis Date   • Allergic rhinitis    • Anxiety    • Depression        Past Surgical History:   Procedure Laterality Date   • BREAST EXCISIONAL BIOPSY Right 2003   • TONSILLECTOMY     • VAGINAL DELIVERY         History reviewed  No pertinent family history  Medications have been verified  Objective   /80 (BP Location: Right arm, Patient Position: Sitting, Cuff Size: Adult)   Pulse 60   Temp 99 °F (37 2 °C) (Tympanic)   Resp 16   LMP 01/09/2023 (Approximate)   SpO2 99%   Patient's last menstrual period was 01/09/2023 (approximate)  Physical Exam     Physical Exam  Vitals and nursing note reviewed  Constitutional:       General: She is not in acute distress  Appearance: Normal appearance  She is well-developed  HENT:      Head: Normocephalic and atraumatic  Jaw: There is normal jaw occlusion        Right Ear: Hearing, tympanic membrane, ear canal and external ear normal       Left Ear: Hearing, tympanic membrane, ear canal and external ear normal  Nose: Mucosal edema and rhinorrhea present  No congestion  Rhinorrhea is clear  Right Turbinates: Swollen  Left Turbinates: Swollen  Right Sinus: No maxillary sinus tenderness or frontal sinus tenderness  Left Sinus: No maxillary sinus tenderness or frontal sinus tenderness  Mouth/Throat:      Lips: Pink  Mouth: Mucous membranes are moist       Dentition: Normal dentition  Pharynx: Uvula midline  Posterior oropharyngeal erythema present  No oropharyngeal exudate  Tonsils: No tonsillar exudate  0 on the right  0 on the left  Eyes:      Conjunctiva/sclera: Conjunctivae normal       Pupils: Pupils are equal, round, and reactive to light  Cardiovascular:      Rate and Rhythm: Normal rate and regular rhythm  Pulses: Normal pulses  Heart sounds: Normal heart sounds  Pulmonary:      Effort: Pulmonary effort is normal       Breath sounds: Normal air entry  No stridor or decreased air movement  Examination of the right-upper field reveals rhonchi  Examination of the left-upper field reveals wheezing and rhonchi  Examination of the right-middle field reveals wheezing  Examination of the left-middle field reveals wheezing  Examination of the right-lower field reveals wheezing  Examination of the left-lower field reveals wheezing  Wheezing and rhonchi present  No decreased breath sounds or rales  Comments: Rhonchi and wheezing greatly improved after albuterol nebulizer administered  Pt reported improvement in symptoms as well  Musculoskeletal:         General: Normal range of motion  Cervical back: Normal range of motion and neck supple  Skin:     General: Skin is warm and dry  Capillary Refill: Capillary refill takes less than 2 seconds  Neurological:      General: No focal deficit present  Mental Status: She is alert and oriented to person, place, and time     Psychiatric:         Mood and Affect: Mood normal          Behavior: Behavior normal

## 2023-01-23 NOTE — PATIENT INSTRUCTIONS
Use albuterol nebulizer or inhaler with spacer as needed for shortness of breath or wheezing  Use at first sign of symptoms, do not wait until symptoms become severe  Continue Flonase nasal spray daily  Increase your water intake and use nasal saline rinses  Recommend Mucinex as needed  Follow up with your PCP or return to the clinic if symptoms worsen or persist more than 3-5 days

## 2023-02-14 ENCOUNTER — OFFICE VISIT (OUTPATIENT)
Dept: URGENT CARE | Facility: CLINIC | Age: 38
End: 2023-02-14

## 2023-02-14 VITALS
SYSTOLIC BLOOD PRESSURE: 140 MMHG | HEART RATE: 70 BPM | OXYGEN SATURATION: 99 % | DIASTOLIC BLOOD PRESSURE: 80 MMHG | RESPIRATION RATE: 16 BRPM | TEMPERATURE: 98.2 F

## 2023-02-14 DIAGNOSIS — R42 DIZZY: Primary | ICD-10-CM

## 2023-02-14 LAB — SL AMB POCT GLUCOSE BLD: 99

## 2023-02-14 NOTE — ASSESSMENT & PLAN NOTE
Brief episode of dizziness after a stressful event, resulted in pt falling secondary to feeling off balance  No syncope reported  Blood sugar WNL, no neuro abnormalities  Suspect vagal response, but recommend pt have an evaluation with her PCP ASAP  Discussed reasons to go to the ER, pt verbalized understanding  All questions answered

## 2023-02-14 NOTE — PROGRESS NOTES
[facility]        NAME: Rakesh Bobby is a 44 y o  female  : 1985    MRN: [id number]  DATE: 2023  TIME: 2:39 PM    Assessment and Plan   Dizzy [R42]  1  Dizzy  POCT blood glucose            Patient Instructions       Please follow up with your primary care provider today  If symptoms reoccur, go to the ER immediately  Eat a regular, healthy diet and drink plenty of water  Try to get at least 8 hours of sleep each night  Engage in 30 minutes of moderate exercise on most days  Practice stress reduction techniques  Please call with any questions or concerns  Proceed to  ER if symptoms worsen  Chief Complaint     Chief Complaint   Patient presents with   • Dizziness         History of Present Illness       Pt had an episode of lightheadedness/ dizziness prior to arrival   She was discussing a stressful conversation she recently had when she felt lightheaded and felt "off-balance", then fell  She denies syncope or injuries from the fall  She was able to "catch herself"  She has been snacking most of the day but has not eaten a full meal   Symptoms are now resolved  This has not happened in the past but she does have a history of anxiety  Review of Systems   Review of Systems   Constitutional: Negative  Negative for chills and fever  HENT: Negative  Negative for congestion, ear discharge, ear pain, hearing loss, postnasal drip, rhinorrhea, sinus pressure, sinus pain and sore throat  Eyes: Negative  Negative for photophobia  Respiratory: Negative  Negative for cough, shortness of breath and wheezing  Cardiovascular: Negative  Negative for chest pain and palpitations  Gastrointestinal: Negative  Genitourinary: Negative  Musculoskeletal: Negative  Skin: Negative  Allergic/Immunologic: Negative  Neurological: Positive for dizziness (briefly, now resolved) and light-headedness   Negative for tremors, seizures, syncope, facial asymmetry, speech difficulty, weakness, numbness and headaches  All other systems reviewed and are negative  Current Medications       Current Outpatient Medications:   •  albuterol (Ventolin HFA) 90 mcg/act inhaler, Inhale 2 puffs every 6 (six) hours as needed for wheezing, Disp: 18 g, Rfl: 0  •  ALPRAZolam (XANAX) 0 5 mg tablet, Take 0 5 mg by mouth 2 (two) times a day, Disp: , Rfl:   •  desogestrel-ethinyl estradiol (KARIVA) 0 15-0 02/0 01 MG (21/5) per tablet, Take 1 tablet by mouth, Disp: , Rfl:   •  loratadine (CLARITIN) 10 mg tablet, Take 1 tablet (10 mg total) by mouth daily, Disp: 30 tablet, Rfl: 0  •  PARoxetine (PAXIL) 40 MG tablet, Take 40 mg by mouth, Disp: , Rfl:   •  Propylene Glycol (Systane Complete) 0 6 % SOLN, Apply 1 drop to eye 2 (two) times a day, Disp: 15 mL, Rfl: 0  •  QUEtiapine (SEROquel) 25 mg tablet, Take 25 mg by mouth, Disp: , Rfl:   •  fluticasone (FLONASE) 50 mcg/act nasal spray, 2 sprays into each nostril daily, Disp: 9 9 mL, Rfl: 0  •  olopatadine HCl (PATADAY) 0 2 % opth drops, Administer 1 drop to both eyes daily for 7 days, Disp: 2 5 mL, Rfl: 0    Current Allergies     Allergies as of 02/14/2023 - Reviewed 02/14/2023   Allergen Reaction Noted   • Prednisone Other (See Comments) 05/31/2009            The following portions of the patient's history were reviewed and updated as appropriate: allergies, current medications, past family history, past medical history, past social history, past surgical history and problem list      Past Medical History:   Diagnosis Date   • Allergic rhinitis    • Anxiety    • Depression        Past Surgical History:   Procedure Laterality Date   • BREAST EXCISIONAL BIOPSY Right 2003   • TONSILLECTOMY     • VAGINAL DELIVERY         History reviewed  No pertinent family history  Medications have been verified          Objective   /80 (BP Location: Right arm, Patient Position: Sitting, Cuff Size: Adult)   Pulse 70   Temp 98 2 °F (36 8 °C) (Tympanic)   Resp 16   SpO2 99%   No LMP recorded  Physical Exam     Physical Exam  Constitutional:       Appearance: Normal appearance  She is well-developed  HENT:      Head: Normocephalic and atraumatic  Right Ear: External ear normal       Left Ear: External ear normal       Nose: Nose normal       Mouth/Throat:      Mouth: Mucous membranes are moist       Pharynx: Oropharynx is clear  Eyes:      Extraocular Movements: Extraocular movements intact  Conjunctiva/sclera: Conjunctivae normal       Pupils: Pupils are equal, round, and reactive to light  Cardiovascular:      Rate and Rhythm: Normal rate and regular rhythm  Pulses: Normal pulses  Heart sounds: Normal heart sounds  Pulmonary:      Effort: Pulmonary effort is normal       Breath sounds: Normal breath sounds  Musculoskeletal:         General: Normal range of motion  Cervical back: Normal range of motion  Skin:     General: Skin is warm and dry  Capillary Refill: Capillary refill takes less than 2 seconds  Neurological:      General: No focal deficit present  Mental Status: She is alert and oriented to person, place, and time  Mental status is at baseline  GCS: GCS eye subscore is 4  GCS verbal subscore is 5  GCS motor subscore is 6  Cranial Nerves: Cranial nerves 2-12 are intact  Sensory: Sensation is intact  Motor: Motor function is intact  Coordination: Coordination is intact  Gait: Gait is intact  Psychiatric:         Attention and Perception: Attention and perception normal          Mood and Affect: Mood and affect normal          Speech: Speech normal          Behavior: Behavior normal          Thought Content:  Thought content normal          Cognition and Memory: Cognition and memory normal          Judgment: Judgment normal

## 2023-02-14 NOTE — PATIENT INSTRUCTIONS
Please follow up with your primary care provider today  If symptoms reoccur, go to the ER immediately  Eat a regular, healthy diet and drink plenty of water  Try to get at least 8 hours of sleep each night  Engage in 30 minutes of moderate exercise on most days  Practice stress reduction techniques  Please call with any questions or concerns

## 2023-06-12 ENCOUNTER — OFFICE VISIT (OUTPATIENT)
Dept: URGENT CARE | Facility: CLINIC | Age: 38
End: 2023-06-12

## 2023-06-12 VITALS
OXYGEN SATURATION: 99 % | SYSTOLIC BLOOD PRESSURE: 120 MMHG | RESPIRATION RATE: 16 BRPM | TEMPERATURE: 97.6 F | DIASTOLIC BLOOD PRESSURE: 80 MMHG | HEART RATE: 58 BPM

## 2023-06-12 DIAGNOSIS — N64.4 BREAST TENDERNESS IN FEMALE: ICD-10-CM

## 2023-06-12 DIAGNOSIS — N30.90 CYSTITIS: Primary | ICD-10-CM

## 2023-06-12 LAB
SL AMB  POCT GLUCOSE, UA: NEGATIVE
SL AMB LEUKOCYTE ESTERASE,UA: 70
SL AMB POCT BILIRUBIN,UA: NEGATIVE
SL AMB POCT BLOOD,UA: 50
SL AMB POCT CLARITY,UA: CLEAR
SL AMB POCT COLOR,UA: YELLOW
SL AMB POCT KETONES,UA: NEGATIVE
SL AMB POCT NITRITE,UA: NEGATIVE
SL AMB POCT PH,UA: 7.5
SL AMB POCT SPECIFIC GRAVITY,UA: 1.01
SL AMB POCT URINE HCG: NEGATIVE
SL AMB POCT URINE PROTEIN: 15
SL AMB POCT UROBILINOGEN: NEGATIVE

## 2023-06-12 PROCEDURE — 87086 URINE CULTURE/COLONY COUNT: CPT | Performed by: NURSE PRACTITIONER

## 2023-06-12 RX ORDER — NITROFURANTOIN 25; 75 MG/1; MG/1
100 CAPSULE ORAL 2 TIMES DAILY
Qty: 14 CAPSULE | Refills: 0 | Status: SHIPPED | OUTPATIENT
Start: 2023-06-12 | End: 2023-06-19

## 2023-06-12 NOTE — PATIENT INSTRUCTIONS
Take antibiotics as prescribed  Take with food  Increase water intake  Please return in 3 days to provide another urine sample  Follow up with your PCP or return to the clinic if symptoms worsen or persist more than 3-5 days

## 2023-06-12 NOTE — ASSESSMENT & PLAN NOTE
History and exam findings consistent with cystitis  Urine dip revealed leukocytes, trace protein, and occult blood  Urine culture sent to the lab  Rx written for Macrobid  Recommend increasing water intake  Reasons to follow up reviewed with pt  All questions answered

## 2023-06-12 NOTE — ASSESSMENT & PLAN NOTE
Urine hcg negative  Symptoms likely due to recent emergency contraception use  Pt will return in 2-3 days to repeat urine hcg and will follow up with her GYN if she misses her next menses

## 2023-06-12 NOTE — PROGRESS NOTES
[facility]        NAME: Juwan Arce is a 44 y o  female  : 1985    MRN: [id number]  DATE: 2023  TIME: 1:21 PM    Assessment and Plan   Cystitis [N30 90]  1  Cystitis  POCT urine dip    Urine culture    nitrofurantoin (MACROBID) 100 mg capsule      2  Breast tenderness in female  POCT urine HCG            Patient Instructions       Take antibiotics as prescribed  Take with food  Increase water intake  Please return in 3 days to provide another urine sample  Follow up with your PCP or return to the clinic if symptoms worsen or persist more than 3-5 days  Proceed to  ER if symptoms worsen  Chief Complaint     Chief Complaint   Patient presents with   • Possible UTI         History of Present Illness       Possible UTI x 1 day  Urinary urgency, urinating small amounts, dysuria  Feels like previous UTIs  No abnormal vaginal discharge  No fever, chills, or flank pain  She did not take anything for this yet  Pt is also requesting a pregnancy test   She had unprotected intercourse about 2-3 weeks ago and took a morning after pill the next day  She reports feeling breast tenderness and mild nausea  Review of Systems   Review of Systems   Constitutional: Negative  Negative for chills, fatigue and fever  Respiratory: Negative  Cardiovascular: Negative  Gastrointestinal: Positive for nausea  Negative for abdominal pain  Genitourinary: Positive for dysuria, frequency and urgency  Negative for flank pain, hematuria, pelvic pain, vaginal discharge and vaginal pain  Skin: Negative for rash  All other systems reviewed and are negative          Current Medications       Current Outpatient Medications:   •  albuterol (Ventolin HFA) 90 mcg/act inhaler, Inhale 2 puffs every 6 (six) hours as needed for wheezing, Disp: 18 g, Rfl: 0  •  ALPRAZolam (XANAX) 0 5 mg tablet, Take 0 5 mg by mouth 2 (two) times a day, Disp: , Rfl:   •  loratadine (CLARITIN) 10 mg tablet, Take 1 tablet (10 mg total) by mouth daily, Disp: 30 tablet, Rfl: 0  •  nitrofurantoin (MACROBID) 100 mg capsule, Take 1 capsule (100 mg total) by mouth 2 (two) times a day for 7 days, Disp: 14 capsule, Rfl: 0  •  PARoxetine (PAXIL) 40 MG tablet, Take 40 mg by mouth, Disp: , Rfl:   •  Propylene Glycol (Systane Complete) 0 6 % SOLN, Apply 1 drop to eye 2 (two) times a day, Disp: 15 mL, Rfl: 0  •  QUEtiapine (SEROquel) 25 mg tablet, Take 25 mg by mouth, Disp: , Rfl:   •  desogestrel-ethinyl estradiol (Fuentes Mila) 0 15-0 02/0 01 MG (21/5) per tablet, Take 1 tablet by mouth (Patient not taking: Reported on 6/12/2023), Disp: , Rfl:   •  fluticasone (FLONASE) 50 mcg/act nasal spray, 2 sprays into each nostril daily, Disp: 9 9 mL, Rfl: 0  •  olopatadine HCl (PATADAY) 0 2 % opth drops, Administer 1 drop to both eyes daily for 7 days, Disp: 2 5 mL, Rfl: 0    Current Allergies     Allergies as of 06/12/2023 - Reviewed 06/12/2023   Allergen Reaction Noted   • Prednisone Other (See Comments) 05/31/2009            The following portions of the patient's history were reviewed and updated as appropriate: allergies, current medications, past family history, past medical history, past social history, past surgical history and problem list      Past Medical History:   Diagnosis Date   • Allergic rhinitis    • Anxiety    • Depression        Past Surgical History:   Procedure Laterality Date   • BREAST EXCISIONAL BIOPSY Right 2003   • TONSILLECTOMY     • VAGINAL DELIVERY         History reviewed  No pertinent family history  Medications have been verified  Objective   /80 (BP Location: Right arm, Patient Position: Sitting, Cuff Size: Adult)   Pulse 58   Temp 97 6 °F (36 4 °C) (Tympanic)   Resp 16   SpO2 99%   No LMP recorded  Physical Exam     Physical Exam  Vitals reviewed  Constitutional:       Appearance: Normal appearance  She is well-developed  HENT:      Head: Normocephalic and atraumatic     Cardiovascular: Rate and Rhythm: Normal rate and regular rhythm  Pulses: Normal pulses  Pulmonary:      Effort: Pulmonary effort is normal    Abdominal:      General: Bowel sounds are normal       Palpations: Abdomen is soft  Tenderness: There is no abdominal tenderness  There is no right CVA tenderness or left CVA tenderness  Musculoskeletal:         General: Normal range of motion  Skin:     General: Skin is warm and dry  Capillary Refill: Capillary refill takes less than 2 seconds  Neurological:      General: No focal deficit present  Mental Status: She is alert and oriented to person, place, and time     Psychiatric:         Mood and Affect: Mood normal          Behavior: Behavior normal

## 2023-06-14 LAB — BACTERIA UR CULT: NORMAL

## 2024-02-21 PROBLEM — J01.00 ACUTE NON-RECURRENT MAXILLARY SINUSITIS: Status: RESOLVED | Noted: 2020-02-27 | Resolved: 2024-02-21

## 2024-02-21 PROBLEM — J11.1 INFLUENZA: Status: RESOLVED | Noted: 2020-01-27 | Resolved: 2024-02-21

## 2024-09-24 ENCOUNTER — OFFICE VISIT (OUTPATIENT)
Dept: URGENT CARE | Facility: CLINIC | Age: 39
End: 2024-09-24

## 2024-09-24 VITALS — SYSTOLIC BLOOD PRESSURE: 110 MMHG | DIASTOLIC BLOOD PRESSURE: 70 MMHG

## 2024-09-24 DIAGNOSIS — N91.2 AMENORRHEA: Primary | ICD-10-CM

## 2024-09-24 LAB
B-HCG SERPL-ACNC: <0.6 MIU/ML (ref 0–5)
FSH SERPL-ACNC: 6.9 MIU/ML
LH SERPL-ACNC: 16.6 MIU/ML
PROLACTIN SERPL-MCNC: 17.52 NG/ML (ref 3.34–26.72)

## 2024-09-24 PROCEDURE — 84702 CHORIONIC GONADOTROPIN TEST: CPT | Performed by: NURSE PRACTITIONER

## 2024-09-24 PROCEDURE — 83001 ASSAY OF GONADOTROPIN (FSH): CPT | Performed by: NURSE PRACTITIONER

## 2024-09-24 PROCEDURE — 84146 ASSAY OF PROLACTIN: CPT | Performed by: NURSE PRACTITIONER

## 2024-09-24 PROCEDURE — 83002 ASSAY OF GONADOTROPIN (LH): CPT | Performed by: NURSE PRACTITIONER

## 2024-09-24 NOTE — PROGRESS NOTES
Assessment/Plan    Amenorrhea [N91.2]  1. Amenorrhea  Follicle stimulating hormone    Luteinizing hormone    Prolactin    hCG, quantitative            Subjective:     Patient ID: Maria D Russell is a 38 y.o. female.      Reason For Visit / Chief Complaint  Chief Complaint   Patient presents with    Amenorrhea     LMP 8-15,          This is a 38-year-old female patient who [facility]. Patient states that her last menstrual cycle was 8-.  She states that she normally has a 33-day cycle so she is 6 days late.  She is requesting a pregnancy test. Patient reports having 2 living children. Patient denies having any pregnancy symptoms. Patient denies breast tenderness. Patient denies urinary symptoms.  Patient does state that she is fatigued but states that she is always fatigued. Patient does report she has a family history of early menopause. Patient denies other symptoms.  Patient denies fever or chills.  Patient denies chest pain or SOB.        Past Medical History:   Diagnosis Date    Allergic rhinitis     Anxiety     Depression        Past Surgical History:   Procedure Laterality Date    BREAST EXCISIONAL BIOPSY Right 2003    TONSILLECTOMY      VAGINAL DELIVERY         No family history on file.    Review of Systems   Constitutional:  Negative for chills and fever.   Respiratory:  Negative for shortness of breath.    Cardiovascular:  Negative for chest pain.   Gastrointestinal:  Negative for abdominal pain.   Endocrine: Negative for polyuria.   Genitourinary:  Positive for menstrual problem. Negative for difficulty urinating, dysuria, flank pain, frequency, hematuria and urgency.   Musculoskeletal:  Negative for myalgias.   Neurological:  Negative for headaches.       Objective:    There were no vitals taken for this visit.    Physical Exam  Vitals and nursing note reviewed.   Constitutional:       Appearance: Normal appearance. She is normal weight.   HENT:      Head: Normocephalic and  atraumatic.   Eyes:      Conjunctiva/sclera: Conjunctivae normal.   Cardiovascular:      Rate and Rhythm: Normal rate.   Pulmonary:      Effort: Pulmonary effort is normal.   Musculoskeletal:         General: Normal range of motion.      Cervical back: Normal range of motion.   Skin:     General: Skin is warm and dry.      Capillary Refill: Capillary refill takes less than 2 seconds.   Neurological:      Mental Status: She is alert and oriented to person, place, and time.   Psychiatric:         Mood and Affect: Mood normal.

## 2024-09-24 NOTE — PATIENT INSTRUCTIONS
We saw you today for amenorrhea also known as absence of menstrual cycle.  We did do a urine pregnancy which was negative or not pregnant.  We are doing a blood test to confirm this and we are also checking some other lab values including hormones. We will share these results with your primary care doctor. rest.  Drink plenty of fluids.  Follow-up with me or your family doctor for any other concerns.

## 2024-10-16 DIAGNOSIS — Z23 NEED FOR IMMUNIZATION AGAINST INFLUENZA: Primary | ICD-10-CM
